# Patient Record
Sex: FEMALE | Race: WHITE | NOT HISPANIC OR LATINO
[De-identification: names, ages, dates, MRNs, and addresses within clinical notes are randomized per-mention and may not be internally consistent; named-entity substitution may affect disease eponyms.]

---

## 2017-01-03 ENCOUNTER — APPOINTMENT (OUTPATIENT)
Dept: HUMAN REPRODUCTION | Facility: CLINIC | Age: 27
End: 2017-01-03

## 2017-01-03 RX ORDER — LETROZOLE TABLETS 2.5 MG/1
2.5 TABLET, FILM COATED ORAL
Qty: 10 | Refills: 0 | Status: ACTIVE | COMMUNITY
Start: 2017-01-03 | End: 1900-01-01

## 2017-01-11 ENCOUNTER — APPOINTMENT (OUTPATIENT)
Dept: HUMAN REPRODUCTION | Facility: CLINIC | Age: 27
End: 2017-01-11

## 2017-01-12 ENCOUNTER — APPOINTMENT (OUTPATIENT)
Dept: HUMAN REPRODUCTION | Facility: CLINIC | Age: 27
End: 2017-01-12

## 2017-01-13 ENCOUNTER — APPOINTMENT (OUTPATIENT)
Dept: HUMAN REPRODUCTION | Facility: CLINIC | Age: 27
End: 2017-01-13

## 2017-01-30 ENCOUNTER — APPOINTMENT (OUTPATIENT)
Dept: HUMAN REPRODUCTION | Facility: CLINIC | Age: 27
End: 2017-01-30

## 2017-01-30 RX ORDER — FOLLITROPIN 900 [IU]/1.08ML
900 INJECTION, SOLUTION SUBCUTANEOUS
Qty: 1 | Refills: 1 | Status: ACTIVE | COMMUNITY
Start: 2017-01-30 | End: 1900-01-01

## 2017-01-30 RX ORDER — GANIRELIX ACETATE 250 UG/.5ML
250 INJECTION, SOLUTION SUBCUTANEOUS
Qty: 4 | Refills: 3 | Status: ACTIVE | COMMUNITY
Start: 2017-01-30 | End: 1900-01-01

## 2017-01-30 RX ORDER — DESOGESTREL AND ETHINYL ESTRADIOL 0.15-0.03
0.15-3 KIT ORAL DAILY
Qty: 1 | Refills: 2 | Status: ACTIVE | COMMUNITY
Start: 2017-01-30 | End: 1900-01-01

## 2017-02-06 ENCOUNTER — APPOINTMENT (OUTPATIENT)
Dept: HUMAN REPRODUCTION | Facility: CLINIC | Age: 27
End: 2017-02-06

## 2017-02-11 ENCOUNTER — APPOINTMENT (OUTPATIENT)
Dept: HUMAN REPRODUCTION | Facility: CLINIC | Age: 27
End: 2017-02-11

## 2017-02-21 ENCOUNTER — APPOINTMENT (OUTPATIENT)
Dept: HUMAN REPRODUCTION | Facility: CLINIC | Age: 27
End: 2017-02-21

## 2017-02-23 ENCOUNTER — OTHER (OUTPATIENT)
Age: 27
End: 2017-02-23

## 2017-02-23 ENCOUNTER — APPOINTMENT (OUTPATIENT)
Dept: HUMAN REPRODUCTION | Facility: CLINIC | Age: 27
End: 2017-02-23

## 2017-02-23 RX ORDER — FOLLITROPIN 300 [IU]/.36ML
300 INJECTION, SOLUTION SUBCUTANEOUS
Qty: 1 | Refills: 3 | Status: ACTIVE | COMMUNITY
Start: 2017-02-23 | End: 1900-01-01

## 2017-02-24 ENCOUNTER — APPOINTMENT (OUTPATIENT)
Dept: HUMAN REPRODUCTION | Facility: CLINIC | Age: 27
End: 2017-02-24

## 2017-02-26 ENCOUNTER — APPOINTMENT (OUTPATIENT)
Dept: HUMAN REPRODUCTION | Facility: CLINIC | Age: 27
End: 2017-02-26

## 2017-02-27 ENCOUNTER — APPOINTMENT (OUTPATIENT)
Dept: HUMAN REPRODUCTION | Facility: CLINIC | Age: 27
End: 2017-02-27

## 2017-02-27 RX ORDER — CHORIOGONADOTROPIN ALFA 10000 UNIT
10000 KIT INTRAMUSCULAR
Qty: 1 | Refills: 0 | Status: ACTIVE | COMMUNITY
Start: 2017-02-27 | End: 1900-01-01

## 2017-03-02 ENCOUNTER — APPOINTMENT (OUTPATIENT)
Dept: HUMAN REPRODUCTION | Facility: CLINIC | Age: 27
End: 2017-03-02

## 2017-03-03 ENCOUNTER — APPOINTMENT (OUTPATIENT)
Dept: HUMAN REPRODUCTION | Facility: CLINIC | Age: 27
End: 2017-03-03

## 2017-03-04 ENCOUNTER — APPOINTMENT (OUTPATIENT)
Dept: HUMAN REPRODUCTION | Facility: CLINIC | Age: 27
End: 2017-03-04

## 2017-03-19 ENCOUNTER — OTHER (OUTPATIENT)
Age: 27
End: 2017-03-19

## 2017-03-19 ENCOUNTER — APPOINTMENT (OUTPATIENT)
Dept: HUMAN REPRODUCTION | Facility: CLINIC | Age: 27
End: 2017-03-19

## 2017-03-19 RX ORDER — DESOGESTREL AND ETHINYL ESTRADIOL AND ETHINYL ESTRADIOL 21-5 (28)
0.15-0.02/0.01 KIT ORAL
Qty: 1 | Refills: 1 | Status: ACTIVE | COMMUNITY
Start: 2017-03-19 | End: 1900-01-01

## 2017-03-19 RX ORDER — DESOGESTREL AND ETHINYL ESTRADIOL AND ETHINYL ESTRADIOL 21-5 (28)
0.15-0.02/0.01 KIT ORAL
Qty: 2 | Refills: 2 | Status: ACTIVE | COMMUNITY
Start: 2017-03-19 | End: 1900-01-01

## 2017-04-24 ENCOUNTER — APPOINTMENT (OUTPATIENT)
Dept: HUMAN REPRODUCTION | Facility: CLINIC | Age: 27
End: 2017-04-24

## 2017-05-12 ENCOUNTER — APPOINTMENT (OUTPATIENT)
Dept: HUMAN REPRODUCTION | Facility: CLINIC | Age: 27
End: 2017-05-12

## 2017-05-15 ENCOUNTER — APPOINTMENT (OUTPATIENT)
Dept: HUMAN REPRODUCTION | Facility: CLINIC | Age: 27
End: 2017-05-15

## 2017-05-18 ENCOUNTER — APPOINTMENT (OUTPATIENT)
Dept: HUMAN REPRODUCTION | Facility: CLINIC | Age: 27
End: 2017-05-18

## 2017-05-18 RX ORDER — FOLLITROPIN 900 [IU]/1.08ML
900 INJECTION, SOLUTION SUBCUTANEOUS
Qty: 2 | Refills: 3 | Status: ACTIVE | COMMUNITY
Start: 2017-05-18 | End: 1900-01-01

## 2017-05-18 RX ORDER — CETRORELIX ACETATE 0.25 MG
0.25 KIT SUBCUTANEOUS
Qty: 4 | Refills: 3 | Status: ACTIVE | COMMUNITY
Start: 2017-05-18 | End: 1900-01-01

## 2017-05-18 RX ORDER — CHORIOGONADOTROPIN ALFA 10000 UNIT
10000 KIT INTRAMUSCULAR
Qty: 1 | Refills: 0 | Status: ACTIVE | COMMUNITY
Start: 2017-05-18 | End: 1900-01-01

## 2017-05-18 RX ORDER — NEEDLES, DISPOSABLE 25GX5/8"
27G X 1/2" NEEDLE, DISPOSABLE MISCELLANEOUS
Qty: 15 | Refills: 2 | Status: ACTIVE | COMMUNITY
Start: 2017-05-18 | End: 1900-01-01

## 2017-05-18 RX ORDER — DOXYCYCLINE HYCLATE 100 MG/1
100 CAPSULE ORAL
Qty: 30 | Refills: 0 | Status: ACTIVE | COMMUNITY
Start: 2017-05-18 | End: 1900-01-01

## 2017-05-18 RX ORDER — MENOTROPINS 75 UNIT
75 KIT SUBCUTANEOUS
Qty: 20 | Refills: 3 | Status: ACTIVE | COMMUNITY
Start: 2017-05-18 | End: 1900-01-01

## 2017-05-23 ENCOUNTER — APPOINTMENT (OUTPATIENT)
Dept: HUMAN REPRODUCTION | Facility: CLINIC | Age: 27
End: 2017-05-23

## 2017-05-25 ENCOUNTER — APPOINTMENT (OUTPATIENT)
Dept: HUMAN REPRODUCTION | Facility: CLINIC | Age: 27
End: 2017-05-25

## 2017-05-26 ENCOUNTER — APPOINTMENT (OUTPATIENT)
Dept: HUMAN REPRODUCTION | Facility: CLINIC | Age: 27
End: 2017-05-26

## 2017-05-26 RX ORDER — MENOTROPINS 75 UNIT
75 KIT SUBCUTANEOUS
Qty: 20 | Refills: 3 | Status: ACTIVE | COMMUNITY
Start: 2017-05-26 | End: 1900-01-01

## 2017-05-26 RX ORDER — FOLLITROPIN 300 [IU]/.36ML
300 INJECTION, SOLUTION SUBCUTANEOUS
Qty: 1 | Refills: 3 | Status: ACTIVE | COMMUNITY
Start: 2017-05-26 | End: 1900-01-01

## 2017-05-27 ENCOUNTER — APPOINTMENT (OUTPATIENT)
Dept: HUMAN REPRODUCTION | Facility: CLINIC | Age: 27
End: 2017-05-27

## 2017-05-28 ENCOUNTER — APPOINTMENT (OUTPATIENT)
Dept: HUMAN REPRODUCTION | Facility: CLINIC | Age: 27
End: 2017-05-28

## 2017-05-29 ENCOUNTER — APPOINTMENT (OUTPATIENT)
Dept: HUMAN REPRODUCTION | Facility: CLINIC | Age: 27
End: 2017-05-29

## 2017-05-30 ENCOUNTER — APPOINTMENT (OUTPATIENT)
Dept: HUMAN REPRODUCTION | Facility: CLINIC | Age: 27
End: 2017-05-30

## 2017-06-02 ENCOUNTER — APPOINTMENT (OUTPATIENT)
Dept: HUMAN REPRODUCTION | Facility: CLINIC | Age: 27
End: 2017-06-02

## 2017-06-04 ENCOUNTER — APPOINTMENT (OUTPATIENT)
Dept: HUMAN REPRODUCTION | Facility: CLINIC | Age: 27
End: 2017-06-04

## 2017-06-13 ENCOUNTER — APPOINTMENT (OUTPATIENT)
Dept: HUMAN REPRODUCTION | Facility: CLINIC | Age: 27
End: 2017-06-13

## 2017-06-13 ENCOUNTER — OTHER (OUTPATIENT)
Age: 27
End: 2017-06-13

## 2017-06-13 RX ORDER — NEEDLES, DISPOSABLE 25GX5/8"
18G X 1-1/2" NEEDLE, DISPOSABLE MISCELLANEOUS
Qty: 40 | Refills: 1 | Status: ACTIVE | COMMUNITY
Start: 2017-06-13 | End: 1900-01-01

## 2017-06-13 RX ORDER — METHYLPREDNISOLONE 8 MG/1
8 TABLET ORAL
Qty: 10 | Refills: 0 | Status: ACTIVE | COMMUNITY
Start: 2017-06-13 | End: 1900-01-01

## 2017-06-13 RX ORDER — ESTRADIOL 2 MG/1
2 TABLET ORAL
Qty: 90 | Refills: 3 | Status: ACTIVE | COMMUNITY
Start: 2017-06-13 | End: 1900-01-01

## 2017-06-13 RX ORDER — SYRINGE WITH NEEDLE, 1 ML 25GX5/8"
22G X 1-1/2" SYRINGE, EMPTY DISPOSABLE MISCELLANEOUS
Qty: 40 | Refills: 1 | Status: ACTIVE | COMMUNITY
Start: 2017-06-13 | End: 1900-01-01

## 2017-06-13 RX ORDER — DOXYCYCLINE HYCLATE 100 MG/1
100 CAPSULE ORAL TWICE DAILY
Qty: 10 | Refills: 0 | Status: ACTIVE | COMMUNITY
Start: 2017-06-13 | End: 1900-01-01

## 2017-06-13 RX ORDER — ISOPROPYL ALCOHOL 70 ML/100ML
SWAB TOPICAL
Qty: 50 | Refills: 0 | Status: ACTIVE | COMMUNITY
Start: 2017-06-13 | End: 1900-01-01

## 2017-06-13 RX ORDER — CONTAINER,EMPTY
EACH MISCELLANEOUS
Qty: 2 | Refills: 0 | Status: ACTIVE | COMMUNITY
Start: 2017-06-13 | End: 1900-01-01

## 2017-06-22 ENCOUNTER — APPOINTMENT (OUTPATIENT)
Dept: HUMAN REPRODUCTION | Facility: CLINIC | Age: 27
End: 2017-06-22

## 2017-06-29 ENCOUNTER — APPOINTMENT (OUTPATIENT)
Dept: HUMAN REPRODUCTION | Facility: CLINIC | Age: 27
End: 2017-06-29

## 2017-06-29 ENCOUNTER — OTHER (OUTPATIENT)
Age: 27
End: 2017-06-29

## 2017-06-29 RX ORDER — FLUCONAZOLE 150 MG/1
150 TABLET ORAL
Qty: 1 | Refills: 2 | Status: ACTIVE | COMMUNITY
Start: 2017-06-29 | End: 1900-01-01

## 2017-06-30 ENCOUNTER — APPOINTMENT (OUTPATIENT)
Dept: HUMAN REPRODUCTION | Facility: CLINIC | Age: 27
End: 2017-06-30

## 2017-06-30 RX ORDER — FLUCONAZOLE 150 MG/1
150 TABLET ORAL
Qty: 1 | Refills: 0 | Status: ACTIVE | COMMUNITY
Start: 2017-06-30 | End: 1900-01-01

## 2017-07-03 ENCOUNTER — APPOINTMENT (OUTPATIENT)
Dept: HUMAN REPRODUCTION | Facility: CLINIC | Age: 27
End: 2017-07-03

## 2017-07-05 ENCOUNTER — APPOINTMENT (OUTPATIENT)
Dept: HUMAN REPRODUCTION | Facility: CLINIC | Age: 27
End: 2017-07-05

## 2017-07-21 ENCOUNTER — APPOINTMENT (OUTPATIENT)
Dept: HUMAN REPRODUCTION | Facility: CLINIC | Age: 27
End: 2017-07-21

## 2017-07-21 DIAGNOSIS — Z33.1 PREGNANT STATE, INCIDENTAL: ICD-10-CM

## 2017-07-21 RX ORDER — ESTRADIOL 2 MG/1
2 TABLET ORAL
Qty: 90 | Refills: 4 | Status: ACTIVE | COMMUNITY
Start: 2017-07-21 | End: 1900-01-01

## 2017-07-21 RX ORDER — DOXYLAMINE SUCCINATE AND PYRIDOXINE HYDROCHLORIDE 10; 10 MG/1; MG/1
10-10 TABLET, DELAYED RELEASE ORAL
Qty: 20 | Refills: 0 | Status: ACTIVE | COMMUNITY
Start: 2017-07-21 | End: 1900-01-01

## 2017-07-21 RX ORDER — PROGESTERONE 50 MG/ML
50 INJECTION, SOLUTION INTRAMUSCULAR
Qty: 2 | Refills: 4 | Status: ACTIVE | COMMUNITY
Start: 2017-07-21 | End: 1900-01-01

## 2017-07-21 RX ORDER — VITAMIN C, CALCIUM, IRON, VITAMIN D3, VITAMIN E, VITAMIN B1, VITAMIN B2, VITAMIN B3, VITAMIN B6, FOLIC ACID, IODINE, ZINC, COPPER, DOCUSATE SODIUM, DOCOSAHEXAENOIC ACID (DHA) 27-1-50 MG
27-1 & 250 KIT ORAL
Qty: 1 | Refills: 5 | Status: ACTIVE | COMMUNITY
Start: 2017-07-21 | End: 1900-01-01

## 2017-07-21 RX ORDER — NEEDLES, DISPOSABLE 25GX5/8"
22G X 1-1/2" NEEDLE, DISPOSABLE MISCELLANEOUS
Qty: 20 | Refills: 3 | Status: ACTIVE | COMMUNITY
Start: 2017-07-21 | End: 1900-01-01

## 2017-07-27 ENCOUNTER — APPOINTMENT (OUTPATIENT)
Dept: HUMAN REPRODUCTION | Facility: CLINIC | Age: 27
End: 2017-07-27
Payer: COMMERCIAL

## 2017-07-27 PROCEDURE — 76817 TRANSVAGINAL US OBSTETRIC: CPT

## 2017-07-27 PROCEDURE — 99213 OFFICE O/P EST LOW 20 MIN: CPT | Mod: 25

## 2017-07-27 RX ORDER — FOLIC ACID 1 MG/1
1 TABLET ORAL DAILY
Qty: 30 | Refills: 2 | Status: ACTIVE | COMMUNITY
Start: 2017-07-27 | End: 1900-01-01

## 2017-08-21 RX ORDER — SYRINGE W-NEEDLE,DISPOSAB,3 ML 25GX5/8"
22G X 1-1/2" SYRINGE, EMPTY DISPOSABLE MISCELLANEOUS
Qty: 15 | Refills: 3 | Status: ACTIVE | COMMUNITY
Start: 2017-05-18 | End: 1900-01-01

## 2017-08-21 RX ORDER — NEEDLES, DISPOSABLE 25GX5/8"
22G X 1-1/2" NEEDLE, DISPOSABLE MISCELLANEOUS
Qty: 20 | Refills: 3 | Status: ACTIVE | COMMUNITY
Start: 2017-08-21 | End: 1900-01-01

## 2017-08-21 RX ORDER — PROGESTERONE 50 MG/ML
50 INJECTION, SOLUTION INTRAMUSCULAR
Qty: 2 | Refills: 6 | Status: ACTIVE | COMMUNITY
Start: 2017-06-13 | End: 1900-01-01

## 2020-08-03 ENCOUNTER — ASOB RESULT (OUTPATIENT)
Age: 30
End: 2020-08-03

## 2020-08-03 ENCOUNTER — APPOINTMENT (OUTPATIENT)
Dept: ANTEPARTUM | Facility: CLINIC | Age: 30
End: 2020-08-03
Payer: COMMERCIAL

## 2020-08-03 PROCEDURE — 76805 OB US >/= 14 WKS SNGL FETUS: CPT

## 2020-08-21 ENCOUNTER — APPOINTMENT (OUTPATIENT)
Dept: ANTEPARTUM | Facility: CLINIC | Age: 30
End: 2020-08-21
Payer: COMMERCIAL

## 2020-08-21 ENCOUNTER — ASOB RESULT (OUTPATIENT)
Age: 30
End: 2020-08-21

## 2020-08-21 PROCEDURE — 76817 TRANSVAGINAL US OBSTETRIC: CPT

## 2020-08-21 PROCEDURE — 76811 OB US DETAILED SNGL FETUS: CPT

## 2020-09-30 ENCOUNTER — TRANSCRIPTION ENCOUNTER (OUTPATIENT)
Age: 30
End: 2020-09-30

## 2020-10-21 ENCOUNTER — APPOINTMENT (OUTPATIENT)
Dept: ANTEPARTUM | Facility: CLINIC | Age: 30
End: 2020-10-21
Payer: COMMERCIAL

## 2020-10-21 ENCOUNTER — ASOB RESULT (OUTPATIENT)
Age: 30
End: 2020-10-21

## 2020-10-21 PROCEDURE — 99072 ADDL SUPL MATRL&STAF TM PHE: CPT

## 2020-10-21 PROCEDURE — 76816 OB US FOLLOW-UP PER FETUS: CPT

## 2020-11-18 ENCOUNTER — ASOB RESULT (OUTPATIENT)
Age: 30
End: 2020-11-18

## 2020-11-18 ENCOUNTER — APPOINTMENT (OUTPATIENT)
Dept: ANTEPARTUM | Facility: CLINIC | Age: 30
End: 2020-11-18
Payer: COMMERCIAL

## 2020-11-18 PROCEDURE — 76816 OB US FOLLOW-UP PER FETUS: CPT

## 2020-12-16 ENCOUNTER — ASOB RESULT (OUTPATIENT)
Age: 30
End: 2020-12-16

## 2020-12-16 ENCOUNTER — APPOINTMENT (OUTPATIENT)
Dept: ANTEPARTUM | Facility: CLINIC | Age: 30
End: 2020-12-16
Payer: COMMERCIAL

## 2020-12-16 PROCEDURE — 99072 ADDL SUPL MATRL&STAF TM PHE: CPT

## 2020-12-16 PROCEDURE — 76816 OB US FOLLOW-UP PER FETUS: CPT

## 2021-01-09 ENCOUNTER — INPATIENT (INPATIENT)
Facility: HOSPITAL | Age: 31
LOS: 1 days | Discharge: HOME | End: 2021-01-11
Attending: OBSTETRICS & GYNECOLOGY | Admitting: OBSTETRICS & GYNECOLOGY
Payer: COMMERCIAL

## 2021-01-09 ENCOUNTER — TRANSCRIPTION ENCOUNTER (OUTPATIENT)
Age: 31
End: 2021-01-09

## 2021-01-09 VITALS — HEART RATE: 91 BPM | SYSTOLIC BLOOD PRESSURE: 112 MMHG | DIASTOLIC BLOOD PRESSURE: 69 MMHG | OXYGEN SATURATION: 99 %

## 2021-01-09 DIAGNOSIS — Z3A.00 WEEKS OF GESTATION OF PREGNANCY NOT SPECIFIED: ICD-10-CM

## 2021-01-09 DIAGNOSIS — O26.899 OTHER SPECIFIED PREGNANCY RELATED CONDITIONS, UNSPECIFIED TRIMESTER: ICD-10-CM

## 2021-01-09 DIAGNOSIS — Z34.80 ENCOUNTER FOR SUPERVISION OF OTHER NORMAL PREGNANCY, UNSPECIFIED TRIMESTER: ICD-10-CM

## 2021-01-09 DIAGNOSIS — Z98.891 HISTORY OF UTERINE SCAR FROM PREVIOUS SURGERY: Chronic | ICD-10-CM

## 2021-01-09 LAB
BASOPHILS # BLD AUTO: 0.02 K/UL — SIGNIFICANT CHANGE UP (ref 0–0.2)
BASOPHILS NFR BLD AUTO: 0.2 % — SIGNIFICANT CHANGE UP (ref 0–2)
EOSINOPHIL # BLD AUTO: 0.02 K/UL — SIGNIFICANT CHANGE UP (ref 0–0.5)
EOSINOPHIL NFR BLD AUTO: 0.2 % — SIGNIFICANT CHANGE UP (ref 0–6)
HCT VFR BLD CALC: 41.4 % — SIGNIFICANT CHANGE UP (ref 34.5–45)
HGB BLD-MCNC: 13.5 G/DL — SIGNIFICANT CHANGE UP (ref 11.5–15.5)
IMM GRANULOCYTES NFR BLD AUTO: 0.6 % — SIGNIFICANT CHANGE UP (ref 0–1.5)
LYMPHOCYTES # BLD AUTO: 0.93 K/UL — LOW (ref 1–3.3)
LYMPHOCYTES # BLD AUTO: 11.5 % — LOW (ref 13–44)
MCHC RBC-ENTMCNC: 29.7 PG — SIGNIFICANT CHANGE UP (ref 27–34)
MCHC RBC-ENTMCNC: 32.6 GM/DL — SIGNIFICANT CHANGE UP (ref 32–36)
MCV RBC AUTO: 91.2 FL — SIGNIFICANT CHANGE UP (ref 80–100)
MONOCYTES # BLD AUTO: 0.48 K/UL — SIGNIFICANT CHANGE UP (ref 0–0.9)
MONOCYTES NFR BLD AUTO: 5.9 % — SIGNIFICANT CHANGE UP (ref 2–14)
NEUTROPHILS # BLD AUTO: 6.62 K/UL — SIGNIFICANT CHANGE UP (ref 1.8–7.4)
NEUTROPHILS NFR BLD AUTO: 81.6 % — HIGH (ref 43–77)
NRBC # BLD: 0 /100 WBCS — SIGNIFICANT CHANGE UP (ref 0–0)
PLATELET # BLD AUTO: 147 K/UL — LOW (ref 150–400)
RBC # BLD: 4.54 M/UL — SIGNIFICANT CHANGE UP (ref 3.8–5.2)
RBC # FLD: 14.4 % — SIGNIFICANT CHANGE UP (ref 10.3–14.5)
SARS-COV-2 RNA SPEC QL NAA+PROBE: SIGNIFICANT CHANGE UP
WBC # BLD: 8.12 K/UL — SIGNIFICANT CHANGE UP (ref 3.8–10.5)
WBC # FLD AUTO: 8.12 K/UL — SIGNIFICANT CHANGE UP (ref 3.8–10.5)

## 2021-01-09 RX ORDER — ACETAMINOPHEN 500 MG
975 TABLET ORAL
Refills: 0 | Status: DISCONTINUED | OUTPATIENT
Start: 2021-01-09 | End: 2021-01-11

## 2021-01-09 RX ORDER — SODIUM CHLORIDE 9 MG/ML
3 INJECTION INTRAMUSCULAR; INTRAVENOUS; SUBCUTANEOUS EVERY 8 HOURS
Refills: 0 | Status: DISCONTINUED | OUTPATIENT
Start: 2021-01-09 | End: 2021-01-11

## 2021-01-09 RX ORDER — OXYTOCIN 10 UNIT/ML
333.33 VIAL (ML) INJECTION
Qty: 20 | Refills: 0 | Status: DISCONTINUED | OUTPATIENT
Start: 2021-01-09 | End: 2021-01-11

## 2021-01-09 RX ORDER — SODIUM CHLORIDE 9 MG/ML
1000 INJECTION, SOLUTION INTRAVENOUS
Refills: 0 | Status: DISCONTINUED | OUTPATIENT
Start: 2021-01-09 | End: 2021-01-10

## 2021-01-09 RX ORDER — OXYCODONE HYDROCHLORIDE 5 MG/1
5 TABLET ORAL ONCE
Refills: 0 | Status: DISCONTINUED | OUTPATIENT
Start: 2021-01-09 | End: 2021-01-11

## 2021-01-09 RX ORDER — OXYCODONE HYDROCHLORIDE 5 MG/1
5 TABLET ORAL
Refills: 0 | Status: DISCONTINUED | OUTPATIENT
Start: 2021-01-09 | End: 2021-01-11

## 2021-01-09 RX ORDER — ACETAMINOPHEN 500 MG
3 TABLET ORAL
Qty: 0 | Refills: 0 | DISCHARGE
Start: 2021-01-09

## 2021-01-09 RX ORDER — DIPHENHYDRAMINE HCL 50 MG
25 CAPSULE ORAL EVERY 6 HOURS
Refills: 0 | Status: DISCONTINUED | OUTPATIENT
Start: 2021-01-09 | End: 2021-01-11

## 2021-01-09 RX ORDER — CITRIC ACID/SODIUM CITRATE 300-500 MG
15 SOLUTION, ORAL ORAL EVERY 6 HOURS
Refills: 0 | Status: DISCONTINUED | OUTPATIENT
Start: 2021-01-09 | End: 2021-01-10

## 2021-01-09 RX ORDER — KETOROLAC TROMETHAMINE 30 MG/ML
30 SYRINGE (ML) INJECTION ONCE
Refills: 0 | Status: DISCONTINUED | OUTPATIENT
Start: 2021-01-09 | End: 2021-01-09

## 2021-01-09 RX ORDER — DIBUCAINE 1 %
1 OINTMENT (GRAM) RECTAL EVERY 6 HOURS
Refills: 0 | Status: DISCONTINUED | OUTPATIENT
Start: 2021-01-09 | End: 2021-01-11

## 2021-01-09 RX ORDER — HYDROCORTISONE 1 %
1 OINTMENT (GRAM) TOPICAL EVERY 6 HOURS
Refills: 0 | Status: DISCONTINUED | OUTPATIENT
Start: 2021-01-09 | End: 2021-01-11

## 2021-01-09 RX ORDER — TETANUS TOXOID, REDUCED DIPHTHERIA TOXOID AND ACELLULAR PERTUSSIS VACCINE, ADSORBED 5; 2.5; 8; 8; 2.5 [IU]/.5ML; [IU]/.5ML; UG/.5ML; UG/.5ML; UG/.5ML
0.5 SUSPENSION INTRAMUSCULAR ONCE
Refills: 0 | Status: DISCONTINUED | OUTPATIENT
Start: 2021-01-09 | End: 2021-01-11

## 2021-01-09 RX ORDER — MAGNESIUM HYDROXIDE 400 MG/1
30 TABLET, CHEWABLE ORAL
Refills: 0 | Status: DISCONTINUED | OUTPATIENT
Start: 2021-01-09 | End: 2021-01-11

## 2021-01-09 RX ORDER — AER TRAVELER 0.5 G/1
1 SOLUTION RECTAL; TOPICAL EVERY 4 HOURS
Refills: 0 | Status: DISCONTINUED | OUTPATIENT
Start: 2021-01-09 | End: 2021-01-11

## 2021-01-09 RX ORDER — PRAMOXINE HYDROCHLORIDE 150 MG/15G
1 AEROSOL, FOAM RECTAL EVERY 4 HOURS
Refills: 0 | Status: DISCONTINUED | OUTPATIENT
Start: 2021-01-09 | End: 2021-01-11

## 2021-01-09 RX ORDER — SERTRALINE 25 MG/1
1 TABLET, FILM COATED ORAL
Qty: 0 | Refills: 0 | DISCHARGE
Start: 2021-01-09

## 2021-01-09 RX ORDER — LANOLIN
1 OINTMENT (GRAM) TOPICAL
Qty: 0 | Refills: 0 | DISCHARGE
Start: 2021-01-09

## 2021-01-09 RX ORDER — OXYTOCIN 10 UNIT/ML
2 VIAL (ML) INJECTION
Qty: 30 | Refills: 0 | Status: DISCONTINUED | OUTPATIENT
Start: 2021-01-09 | End: 2021-01-11

## 2021-01-09 RX ORDER — IBUPROFEN 200 MG
600 TABLET ORAL EVERY 6 HOURS
Refills: 0 | Status: COMPLETED | OUTPATIENT
Start: 2021-01-09 | End: 2021-12-08

## 2021-01-09 RX ORDER — SIMETHICONE 80 MG/1
80 TABLET, CHEWABLE ORAL EVERY 4 HOURS
Refills: 0 | Status: DISCONTINUED | OUTPATIENT
Start: 2021-01-09 | End: 2021-01-11

## 2021-01-09 RX ORDER — IBUPROFEN 200 MG
1 TABLET ORAL
Qty: 0 | Refills: 0 | DISCHARGE
Start: 2021-01-09

## 2021-01-09 RX ORDER — LANOLIN
1 OINTMENT (GRAM) TOPICAL EVERY 6 HOURS
Refills: 0 | Status: DISCONTINUED | OUTPATIENT
Start: 2021-01-09 | End: 2021-01-11

## 2021-01-09 RX ORDER — BENZOCAINE 10 %
1 GEL (GRAM) MUCOUS MEMBRANE EVERY 6 HOURS
Refills: 0 | Status: DISCONTINUED | OUTPATIENT
Start: 2021-01-09 | End: 2021-01-11

## 2021-01-09 RX ORDER — SERTRALINE 25 MG/1
100 TABLET, FILM COATED ORAL DAILY
Refills: 0 | Status: DISCONTINUED | OUTPATIENT
Start: 2021-01-09 | End: 2021-01-11

## 2021-01-09 RX ADMIN — Medication 30 MILLIGRAM(S): at 23:30

## 2021-01-09 RX ADMIN — SODIUM CHLORIDE 125 MILLILITER(S): 9 INJECTION, SOLUTION INTRAVENOUS at 16:39

## 2021-01-09 RX ADMIN — Medication 2 MILLIUNIT(S)/MIN: at 16:39

## 2021-01-09 NOTE — OB PROVIDER LABOR PROGRESS NOTE - ASSESSMENT
31 y/o  at 40w1d TOLAC. s/p CB, pitocin currently at 2.  - increase pitocin as able  - AROM when cervix is more favorable    d/w Dr. Loulou Smiley PGY1

## 2021-01-09 NOTE — OB PROVIDER H&P - HISTORY OF PRESENT ILLNESS
Admission H&P    Subjective  HPI: 31yo  @40.1 presents w/regular painful contractions, . +FM. -LOF. -CTXs. -VB. Pt denies any other concerns.    – PNC: Denies prenatal issues. GBS _.  EFW _g by sono.  – OBHx:   – GynHx: denies h/o fibroids, cysts, abnormal paps, STDs including HSV  – PMH: denies  – PSH: denies  – Psych: denies h/o anxiety or depression  – Social: denies t/a/d in pregnancy  – Meds: PNV   – Allergies: NKDA  – Will accept blood transfusions? Yes    Objective  T(C): 36.8 (21 @ 10:13), Max: 36.8 (21 @ 10:13)  HR: 89 (21 @ 12:41) (77 - 98)  BP: 112/69 (21 @ 10:13) (112/69 - 112/69)  RR: 20 (21 @ 10:13) (20 - 20)  SpO2: 98% (21 @ 12:41) (85% - 99%)      – PE:   CV: RRR  Pulm: breathing comfortably on RA  Abd: gravid, nontender  Extr: moving all extremities with ease  – FS:   – Spec: pooling, nitrazine, ferning, bleeding,  (lesions if patient with HSV2 history)  – VE: //  – FHT: baseline 1, mod variability, +accels, -decels  – [place]: qmin  – EFW: _g by sono  – Sono: vertex    Assessment  – No prenatal issues. GBS _.    Plan  1. Admit to LND. Routine Labs. IVF.  2. Expectant management/IOL w/  3. Fetus: cat 1 tracing. VTX. EFW _g by sono. Continuous EFM. Sono. No concerns.  4. Prenatal issues: none  5. GBS _  6. Pain: IV pain meds/epidural per attending     Patient discussed with attending physician,  .          Operative report was checked and there is no extension and uterus was closed in two layer.  now she is in early labor. Admission H&P    Subjective  HPI: 29yo  @40.1 presents w/regular painful contractions and VB w/an uncomplicated pregnancy. +FM. -LOF. She would like to TOLAC after previous C/S for TIUP in 2018. Per operative report, no extension and uterus was closed in two layers. Pt denies any other concerns. GBS neg, EFW 3600.    – PNC: Denies prenatal issues. GBS negative.  EFW 3600g by sono.  – OBHx: 2018 - C/S for TIUP @36 weeks, IVF pregnancy, 5lb and 5lb 6oz  – GynHx: denies h/o fibroids, cysts, abnormal paps, STDs including HSV  – PMH: denies  – PSH: C/S x1  – Psych: h/o PPD after previous pregnancy, on antidepressants and seeing psychiatrist regularly  – Social: denies t/a/d in pregnancy  – Meds: PNV, sertaline 100mg QD  – Allergies: NKDA  – Will accept blood transfusions? Yes    Objective  T(C): 36.8 (21 @ 10:13), Max: 36.8 (21 @ 10:13)  HR: 89 (21 @ 12:41) (77 - 98)  BP: 112/69 (21 @ 10:13) (112/69 - 112/69)  RR: 20 (21 @ 10:13) (20 - 20)  SpO2: 98% (21 @ 12:41) (85% - 99%)    – PE:   CV: RRR  Pulm: breathing comfortably on RA  Abd: gravid, nontender  Extr: moving all extremities with ease  – VE: 2/50/-3  – FHT: baseline 150, mod variability, +accels, -decels  – [place]: q5min  – EFW: 3600g by sono  – Sono: vertex    Assessment  TOLAC, h/o PPD on sertraline, GBS negative.    Plan  1. Admit to LND. Routine Labs. IVF.  2. Cervical balloon after epidural   3. Fetus: cat 1 tracing. VTX. EFW _g by sono. Continuous EFM. Sono. No concerns.  4. Prenatal issues: h/o PPD in previous pregnancy, currently on sertraline. SW consult.   5. GBS negative  6. Pain: IV pain meds/epidural per attending     Patient discussed with attending physician, Dr. Jamil.

## 2021-01-09 NOTE — OB PROVIDER H&P - NSFIRSTDATEVISIT_OBGYN_ALL_OB
[facility]    Discharge Summary    Bradford Chino Patient Status:  Inpatient    1941 MRN [id number]   Location [facility] 3W/SW Attending Bria Ford MD   [address] Day # 2 PCP Michele Shane Unknown

## 2021-01-09 NOTE — DISCHARGE NOTE OB - PATIENT PORTAL LINK FT
You can access the FollowMyHealth Patient Portal offered by [facility] by registering at the following website: [email or web address]. By joining [employer]’s FollowMyHealth portal, you will also be able to view your health information using other applications (apps) compatible with our system.

## 2021-01-09 NOTE — DISCHARGE NOTE OB - MEDICATION SUMMARY - MEDICATIONS TO TAKE
I will START or STAY ON the medications listed below when I get home from the hospital:    acetaminophen 325 mg oral tablet  -- 3 tab(s) by mouth   -- Indication: For for moderate pain    ibuprofen 600 mg oral tablet  -- 1 tab(s) by mouth every 6 hours  -- Indication: For for sever pain    sertraline 100 mg oral tablet  -- 1 tab(s) by mouth once a day  -- Indication: For for anxiety    lanolin topical ointment  -- 1 application on skin every 6 hours, As needed, nipple soreness  -- Indication: For for breast feeding    Prenatal Multivitamins with Folic Acid 1 mg oral tablet  -- 1 tab(s) by mouth once a day  -- Indication: For for breast feeding

## 2021-01-09 NOTE — OB NEONATOLOGY/PEDIATRICIAN DELIVERY SUMMARY - NSPEDSNEONOTESA_OBGYN_ALL_OB_FT
Baby is a 40.1wk GA M born to 29y/o  mother via . Maternal history anxiety, depression, and post-partum depression. Prenatal history uncomplicated. Maternal BT O+. PNL neg, NR, and immune. GBS neg unknown. AROM at 21:22 on , thick mec fluids. Baby born vigorous and crying spontaneously. WDSS. Apgars 8/9. EOS .10. Mom plans to breastfeed. COVID status negative.    Physical Exam (Post-Delivery)  Gen: NAD; well-appearing  HEENT: NC/AT; anterior fontanelle open and flat; ears and nose clinically patent, normally set; no tags, no cleft palate appreciated  Skin: pink, warm, well-perfused, no rash  Resp: non-labored breathing  Abd: soft, NT/ND; no masses appreciated, umbilical cord with 3 vessels  Extremities: moving all extremities, no crepitus; hips negative O/B  MSK: no clavicular fracture appreciated  : Lamin I; no abnormalities; anus patent  Back: + sacral dimple  Neuro: +emmett, +babinski, grasp, good tone throughout

## 2021-01-09 NOTE — OB PROVIDER LABOR PROGRESS NOTE - ASSESSMENT
-CB successfully placed, 80 cc sterile water placed in uterine and vaginal balloon  -epidural top off for pain control  -category 1 tracing    d/w Dr. Loulou Askew PGY2

## 2021-01-09 NOTE — OB RN DELIVERY SUMMARY - NS_SEPSISRSKCALC_OBGYN_ALL_OB_FT
EOS calculated successfully. EOS Risk Factor: 0.5/1000 live births ([facility] national incidence); GA=40w1d; Temp=98.96; ROM=0.8; GBS='Unknown'; Antibiotics='No antibiotics or any antibiotics < 2 hrs prior to birth'

## 2021-01-09 NOTE — OB PROVIDER LABOR PROGRESS NOTE - ASSESSMENT
29yo  at 40+1 here for TOLAC. Pt examined in setting of reporting LOF and painful contractions. SVE 6.5/90/0, membranes palpated. Noted to have significant blood show, tracing cat 1. Continue current management, anticipate .    Cynthia WEST

## 2021-01-09 NOTE — DISCHARGE NOTE OB - HOSPITAL COURSE
she is admitted went to labor , labor augmented with low dose pitocin had vaginal delivery of baby girl. admitted in labor , augmented with cervical foly and low dose pitocing, vaginal delivery with second degree tear was noticed.

## 2021-01-09 NOTE — OB PROVIDER DELIVERY SUMMARY - NSPROVIDERDELIVERYNOTE_OBGYN_ALL_OB_FT
Spontaneous vaginal delivery of liveborn infant from OA position. Nuchal cord x2 that was reduced prior to delivery of shoulders and body. Infant was suctioned, heavy mec present. Cord was clamped and cut and infant was passed to pediatrician. Placenta was delivered intact with a 3 vessel cord. Fundal massage was given and uterus was found to be firm. Vaginal exam revealed an intact cervix, vaginal walls and sulci. Patient had a second degree laceration in the perineum and right labial tear which were repaired with 2.0 chromic suture x2 and 3.0 chromic suture x2. Excellent hemostasis was noted. Count was correct x2.    Aliza Smiley PGY1 patient came in to labor room in early labor, uncomfortable, she was admitted and labor augmented by placing cervical balloon, and was started on low dose Pitocin , max was 6 miu contractions 2-3 min apart, AROM was done at 10 cm where thick mec was noticed fh has been unremarkable all throw out.  Spontaneous vaginal delivery of liveborn infant from OA position. Nuchal cord x2 that was reduced prior to delivery of shoulders and body. Infant was suctioned, heavy mec present. Cord was clamped and cut and infant was passed to pediatrician. Placenta was delivered intact with a 3 vessel cord. Fundal massage was given and uterus was found to be firm. Vaginal exam revealed an intact cervix, vaginal walls and sulci. Patient had a second degree laceration in the perineum and right labial tear which were repaired with 2.0 chromic suture x2 and 3.0 chromic suture x2. Excellent hemostasis was noted. Count was correct x2.    Aliza Smiley PGY1

## 2021-01-09 NOTE — OB PROVIDER H&P - ASSESSMENT
Assessment  TOLAC, h/o PPD on sertraline, GBS negative.    Plan  1. Admit to LND. Routine Labs. IVF.  2. Cervical balloon after epidural. Cytotec contraindicated.   3. Fetus: cat 1 tracing. VTX. EFW 3600g by sono. Continuous EFM. Sono. No concerns.  4. Prenatal issues: h/o PPD in previous pregnancy, currently on sertraline. SW consult.   5. GBS negative  6. Pain: IV pain meds/epidural per attending     Patient discussed with attending physician, Dr. Jamil.

## 2021-01-09 NOTE — DISCHARGE NOTE OB - CARE PLAN
Principal Discharge DX:	Term pregnancy delivered  Goal:	safe delivery of mother and baby  Assessment and plan of treatment:	post partum care  Secondary Diagnosis:	Anxiety  Goal:	mental stability  Assessment and plan of treatment:	continue with her medication , follow up with her phycologist and family support

## 2021-01-09 NOTE — OB PROVIDER TRIAGE NOTE - HISTORY OF PRESENT ILLNESS
term pregnancy with history of previous cesarian section, for twin pregnancy. not is trying for TOLAC.  Operative report was checked and there is no extension and uterus was closed in two layer.  now she is in early labor.

## 2021-01-09 NOTE — DISCHARGE NOTE OB - ADDITIONAL INSTRUCTIONS
follow up in 4 week call if heavy vaginal bleeding, difficulty bleeding, if she has depression, or over whelmed contact our office or her phycologist,

## 2021-01-09 NOTE — OB RN DELIVERY SUMMARY - BABY A: APGAR 1 MIN MUSCLE TONE, DELIVERY
Antwon Galarza is shorter on breath than usual and has some back pain, scheduled tomorrow at 1:15 should she be seen sooner? pneumonia?
Called Tracy and advised   Script sent to [facility]
Calling Jeovany Vicente- she is with her mom right now     Roshan Yip was out yesterday and she was c/o back pain.  Thought it was muscular    Good appetite, slept ok last night   She isn't really \"struggling to breathe\"   She is feeling more SOB with exertion, still ha
Have Asher start on Levaquin 500 mg once a day, #10. Take the first 1 today so that if there is any start of pneumonia will get a jump on it with the antibiotic.
(2) well flexed

## 2021-01-09 NOTE — DISCHARGE NOTE OB - CARE PROVIDER_API CALL
Cinthya Jamil J  OBSTETRICS AND GYNECOLOGY  [address], [address]  [place] [address]  Phone: [phone]  Fax: [phone]  Established Patient  Follow Up Time: 1 month

## 2021-01-09 NOTE — OB PROVIDER LABOR PROGRESS NOTE - NS_OBIHIFHRDETAILS_OBGYN_ALL_OB_FT
baseline 135, mod elvis, +accels, no decel
130s, mod elvis, +accels, -decels
baseline 135. mod variability, + accels, no decels

## 2021-01-09 NOTE — OB PROVIDER LABOR PROGRESS NOTE - NS_SUBJECTIVE/OBJECTIVE_OBGYN_ALL_OB_FT
Pt seen for placement of cervical balloon
Evaluated patient for cervical change.
Pt feels leaking and more painful contractions

## 2021-01-10 DIAGNOSIS — O34.219 MATERNAL CARE FOR UNSPECIFIED TYPE SCAR FROM PREVIOUS CESAREAN DELIVERY: ICD-10-CM

## 2021-01-10 DIAGNOSIS — F32.9 MAJOR DEPRESSIVE DISORDER, SINGLE EPISODE, UNSPECIFIED: ICD-10-CM

## 2021-01-10 RX ORDER — IBUPROFEN 200 MG
600 TABLET ORAL EVERY 6 HOURS
Refills: 0 | Status: DISCONTINUED | OUTPATIENT
Start: 2021-01-10 | End: 2021-01-11

## 2021-01-10 RX ADMIN — Medication 975 MILLIGRAM(S): at 21:12

## 2021-01-10 RX ADMIN — Medication 600 MILLIGRAM(S): at 12:33

## 2021-01-10 RX ADMIN — Medication 975 MILLIGRAM(S): at 10:27

## 2021-01-10 RX ADMIN — Medication 975 MILLIGRAM(S): at 03:33

## 2021-01-10 RX ADMIN — SODIUM CHLORIDE 3 MILLILITER(S): 9 INJECTION INTRAMUSCULAR; INTRAVENOUS; SUBCUTANEOUS at 06:00

## 2021-01-10 RX ADMIN — Medication 1 TABLET(S): at 12:35

## 2021-01-10 RX ADMIN — Medication 600 MILLIGRAM(S): at 06:36

## 2021-01-10 RX ADMIN — Medication 975 MILLIGRAM(S): at 16:07

## 2021-01-10 RX ADMIN — Medication 600 MILLIGRAM(S): at 18:37

## 2021-01-10 NOTE — CHART NOTE - NSCHARTNOTEFT_GEN_A_CORE
R1 Chart Note     Nurse notified that patient will not be going home today as the baby has not stooled or urinated as of yet. Peds would like baby to stay until the morning for more evaluation     d/w Dr. Ella Dyer, PGY-1 R1 Chart Note     Nurse notified that patient will not be going home today as the baby has not stooled or urinated as of yet. Peds would like baby to stay until the morning for more evaluation      -Discharge order cancelled and changed for 1/11     d/w Dr. Ella Dyer, PGY-1

## 2021-01-10 NOTE — PROGRESS NOTE ADULT - ASSESSMENT
A: 30y PPD#1 s/p  doing well. with possible strech of left inguinal ligament from pushing.    Plan:  Routine postpartum care  Encouraged out of bed  Regular diet  Tylenol for pain  will discharge home if baby is clear after 24 hours other wise tomorrow AM.

## 2021-01-11 VITALS
DIASTOLIC BLOOD PRESSURE: 72 MMHG | OXYGEN SATURATION: 98 % | RESPIRATION RATE: 18 BRPM | TEMPERATURE: 99 F | HEART RATE: 70 BPM | SYSTOLIC BLOOD PRESSURE: 104 MMHG

## 2021-01-11 LAB
SARS-COV-2 IGG SERPL QL IA: NEGATIVE — SIGNIFICANT CHANGE UP
SARS-COV-2 IGM SERPL IA-ACNC: <0.1 INDEX — SIGNIFICANT CHANGE UP

## 2021-01-11 PROCEDURE — G0463: CPT

## 2021-01-11 PROCEDURE — U0005: CPT

## 2021-01-11 PROCEDURE — 59025 FETAL NON-STRESS TEST: CPT

## 2021-01-11 PROCEDURE — 59050 FETAL MONITOR W/REPORT: CPT

## 2021-01-11 PROCEDURE — 86780 TREPONEMA PALLIDUM: CPT

## 2021-01-11 PROCEDURE — 87635 SARS-COV-2 COVID-19 AMP PRB: CPT

## 2021-01-11 PROCEDURE — 86769 SARS-COV-2 COVID-19 ANTIBODY: CPT

## 2021-01-11 PROCEDURE — 85025 COMPLETE CBC W/AUTO DIFF WBC: CPT

## 2021-01-11 RX ADMIN — Medication 600 MILLIGRAM(S): at 11:06

## 2021-01-11 RX ADMIN — SERTRALINE 100 MILLIGRAM(S): 25 TABLET, FILM COATED ORAL at 12:50

## 2021-01-11 RX ADMIN — Medication 1 TABLET(S): at 12:48

## 2021-01-11 NOTE — PROGRESS NOTE ADULT - ASSESSMENT
A: 30y PPD#2 s/p  doing well.    Plan:  Routine postpartum care  Encouraged out of bed  Regular diet

## 2021-01-11 NOTE — PROGRESS NOTE ADULT - SUBJECTIVE AND OBJECTIVE BOX
S: Patient doing well. No complaints. Minimal lochia. Pain controlled.    O: Vital Signs Last 24 Hrs  T(C): 37 (11 Jan 2021 05:30), Max: 37 (11 Jan 2021 05:30)  T(F): 98.6 (11 Jan 2021 05:30), Max: 98.6 (11 Jan 2021 05:30)  HR: 70 (11 Jan 2021 05:30) (68 - 100)  BP: 104/72 (11 Jan 2021 05:30) (104/72 - 106/74)  BP(mean): --  RR: 18 (11 Jan 2021 05:30) (17 - 18)  SpO2: 98% (11 Jan 2021 05:30) (96% - 98%)    Gen: NAD  Abd: soft, Nontender, Nondistended, fundus firm  Ext: no tendern, mild edema    Labs:                        13.5   8.12  )-----------( 147      ( 09 Jan 2021 13:33 )             41.4       
S: Patient doing well. No complaints. Minimal lochia. Pain controlled.  she is breast feeding.  she is having pain in left inguinal area, she is able to walk and get in and out of bed. no weakness in the leg    O: Vital Signs Last 24 Hrs  T(C): 37.1 (10 Dalton 2021 08:26), Max: 37.2 (09 Jan 2021 20:00)  T(F): 98.7 (10 Dalton 2021 08:26), Max: 99 (10 Dalton 2021 05:50)  HR: 70 (10 Dalton 2021 08:26) (70 - 141)  BP: 105/70 (10 Dalton 2021 08:26) (89/53 - 149/67)  BP(mean): 77 (10 Dalton 2021 00:35) (74 - 81)  RR: 18 (10 Dalton 2021 08:26) (17 - 22)  SpO2: 96% (10 Dalton 2021 08:26) (61% - 100%)    Gen: NAD  Abd: soft, Nontender, Nondistended, fundus firm  Ext: no tendern, mild edema good tone both legs    Labs:                        13.5   8.12  )-----------( 147      ( 09 Jan 2021 13:33 )             41.4       
PA PPD#1  Note    Blood Type:   O+  Pain:  Controlled  Complaints:  None  Pt. is OOB, voiding, passing flatus, & tolerating regular diet.  Lochia:  WNL    VS:  Vital Signs Last 24 Hrs  T(C): 37.2 (10 Dalton 2021 05:50), Max: 37.2 (2021 20:00)  T(F): 99 (10 Dalton 2021 05:50), Max: 99 (10 Dalton 2021 05:50)  HR: 83 (10 Dalton 2021 05:50) (71 - 141)  BP: 102/66 (10 Dalton 2021 05:50) (89/53 - 149/67)  BP(mean): 77 (10 Dalton 2021 00:35) (74 - 81)  RR: 18 (10 Dalton 2021 05:50) (17 - 22)  SpO2: 95% (10 Dalton 2021 05:50) (61% - 100%)                        13.5   8.12  )-----------( 147      ( 2021 13:33 )             41.4       Abd:  Soft, non-distended, non-tender            Fundus-firm  Laceration-2nd Degreey: C/D/I  Extremities:  Edema - none                      Calf Tenderness - none    Assessment:    30 y.o. G 2 P 1103 (h/o twins)      PPD # 1 S/P  in stable condition.  PMHx significant for:  PP Depression  Current Issues:  None  Plan:  Increase OOB             Cont. Pain Protocol             Cont. Reg. Diet             Cont. PP Care.             Cont. Sertaline    GIAN Chaney

## 2021-01-12 LAB — T PALLIDUM AB TITR SER: NEGATIVE — SIGNIFICANT CHANGE UP

## 2021-01-25 ENCOUNTER — APPOINTMENT (OUTPATIENT)
Dept: ULTRASOUND IMAGING | Facility: CLINIC | Age: 31
End: 2021-01-25
Payer: COMMERCIAL

## 2021-01-25 ENCOUNTER — OUTPATIENT (OUTPATIENT)
Dept: OUTPATIENT SERVICES | Facility: HOSPITAL | Age: 31
LOS: 1 days | End: 2021-01-25
Payer: COMMERCIAL

## 2021-01-25 DIAGNOSIS — Z98.891 HISTORY OF UTERINE SCAR FROM PREVIOUS SURGERY: Chronic | ICD-10-CM

## 2021-01-25 DIAGNOSIS — Z00.8 ENCOUNTER FOR OTHER GENERAL EXAMINATION: ICD-10-CM

## 2021-01-25 PROBLEM — K21.9 GASTRO-ESOPHAGEAL REFLUX DISEASE WITHOUT ESOPHAGITIS: Chronic | Status: ACTIVE | Noted: 2021-01-09

## 2021-01-25 PROBLEM — F32.9 MAJOR DEPRESSIVE DISORDER, SINGLE EPISODE, UNSPECIFIED: Chronic | Status: ACTIVE | Noted: 2021-01-09

## 2021-01-25 PROBLEM — F41.9 ANXIETY DISORDER, UNSPECIFIED: Chronic | Status: ACTIVE | Noted: 2021-01-09

## 2021-01-25 PROCEDURE — 76700 US EXAM ABDOM COMPLETE: CPT

## 2021-01-25 PROCEDURE — 76700 US EXAM ABDOM COMPLETE: CPT | Mod: 26

## 2022-08-10 NOTE — OB PROVIDER H&P - NSLASTLIVEBIRTH_OBGYN_ALL_OB_DT
09-Jan-2018 Prednisone Counseling:  I discussed with the patient the risks of prolonged use of prednisone including but not limited to weight gain, insomnia, osteoporosis, mood changes, diabetes, susceptibility to infection, glaucoma and high blood pressure.  In cases where prednisone use is prolonged, patients should be monitored with blood pressure checks, serum glucose levels and an eye exam.  Additionally, the patient may need to be placed on GI prophylaxis, PCP prophylaxis, and calcium and vitamin D supplementation and/or a bisphosphonate.  The patient verbalized understanding of the proper use and the possible adverse effects of prednisone.  All of the patient's questions and concerns were addressed.

## 2022-10-17 NOTE — OB PROVIDER TRIAGE NOTE - NS_FETALMOVEMENT_OBGYN_ALL_OB
Pre-Op H&P  Kt Davis  [id number]  1953      Chief complaint: Left hip pain      Subjective:  Patient is a 69 y.o.male presents for scheduled surgery by Dr. Swenson. He anticipates a TOTAL HIP ARTHROPLASTY LEFT today. His hip has been painful for about 2 years. He uses a cane for ambulation. He denies recent falls. He had right hip replacement 9/12/22; which he recovered well.      Review of Systems:  Constitutional-- No fever, chills or sweats. No fatigue.  CV-- No chest pain, palpitation or syncope. +HTN  Resp-- No SOB, cough, hemoptysis  Skin--No rashes or lesions      Allergies:   Allergies   Allergen Reactions   • Lisinopril Cough   • Valsartan Cough         Home Meds:  Medications Prior to Admission   Medication Sig Dispense Refill Last Dose   • acetaminophen (TYLENOL) 650 MG 8 hr tablet Take 1 tablet by mouth Every 8 (Eight) Hours As Needed for Mild Pain.   10/16/2022 at 23450   • amLODIPine (NORVASC) 5 MG tablet Take 1 tablet by mouth Daily. (Patient taking differently: Take 1 tablet by mouth Every Evening.) 30 tablet 3 10/16/2022 at 2000   • aspirin (ASPIR) 81 MG EC tablet Take 1 tablet by mouth 2 (Two) Times a Day. 60 tablet 0 10/16/2022 at 2100   • Chlorhexidine Gluconate 4 % solution Shower daily with solution as directed for 5 days prior to surgery. 237 mL 0 10/16/2022 at 2000   • Magnesium Oxide 500 MG tablet Take 1 tablet by mouth Daily.   10/16/2022 at 0800   • Multiple Vitamins-Minerals (MULTIVITAL PO) Take 1 dose by mouth Every Morning.   10/16/2022 at 0800   • mupirocin (BACTROBAN) 2 % ointment Apply a pea-sized amount into each nostril as directed by provider twice daily for 5 days prior to surgery 22 g 0 10/16/2022 at 2000   • celecoxib (CeleBREX) 200 MG capsule Take 200 mg by mouth 2 (Two) Times a Day.   10/3/2022   • Dextromethorphan-guaiFENesin (MUCUS RELIEF DM PO) Take 1,200 mg by mouth As Needed.   More than a month   • oxyCODONE (Roxicodone) 5 MG immediate release tablet Take 1  "tablet by mouth Every 4 (Four) Hours As Needed for Moderate Pain. 40 tablet 0          PMH:   Past Medical History:   Diagnosis Date   • Arthritis    • Hip arthrosis 01/2021   • History of esophageal stricture 2015   • History of gastric ulcer 2017   • History of transfusion 2017    4 units due to GI bleed, no reactions   • Hypertension    • Inguinal hernia    • Knee swelling 01/2022   • Post-traumatic brain syndrome 2018    r/t falling outside in winter. Lost sense of smell, concussion and brain bleed     PSH:    Past Surgical History:   Procedure Laterality Date   • APPENDECTOMY  1999   • COLONOSCOPY  2015   • ESOPHAGEAL DILATATION  2015   • HIP SURGERY  9/12/2022   • INGUINAL HERNIA REPAIR Bilateral 06/18/2020    Procedure: INGUINAL HERNIA REPAIR BILATERAL WITH MESH;  Surgeon: Johnathan Durant MD;  [facility];  Service: General;  Laterality: Bilateral;   • TONSILLECTOMY AND ADENOIDECTOMY  1959   • TOTAL HIP ARTHROPLASTY Right 09/12/2022    Procedure: TOTAL HIP ARTHROPLASTY RIGHT;  Surgeon: Davin Swenson MD;  Location: [facility] OR;  Service: Orthopedics;  Laterality: Right;   • UPPER GASTROINTESTINAL ENDOSCOPY  2015       Immunization History:  Influenza: 2022  Pneumococcal: UTD  Tetanus: UTD  Covid x4: 2022    Social History:   Tobacco:   Social History     Tobacco Use   Smoking Status Never   Smokeless Tobacco Never      Alcohol:     Social History     Substance and Sexual Activity   Alcohol Use No         Physical Exam:/90 (BP Location: Left arm, Patient Position: Lying)   Pulse 99   Temp 97.4 °F (36.3 °C) (Tympanic)   Resp 16   Ht 175.3 cm (69\")   Wt 88.9 kg (196 lb)   SpO2 98%   BMI 28.94 kg/m²       General Appearance:    Alert, cooperative, no distress, appears stated age   Head:    Normocephalic, without obvious abnormality, atraumatic   Lungs:     Clear to auscultation bilaterally, respirations unlabored    Heart:   Regular rate and rhythm, S1 and S2 normal    Abdomen:    " Soft without tenderness   Extremities:   Extremities normal, atraumatic, no cyanosis or edema   Skin:   Skin color, texture, turgor normal, no rashes or lesions   Neurologic:   Grossly intact     Results Review:     LABS:  Lab Results   Component Value Date    WBC 7.54 10/10/2022    HGB 14.2 10/10/2022    HCT 41.2 10/10/2022    MCV 96.0 10/10/2022     10/10/2022    NEUTROABS 4.30 10/10/2022    GLUCOSE 89 10/10/2022    BUN 18 10/10/2022    CREATININE 1.06 10/10/2022    EGFRIFNONA 75 11/09/2021     10/10/2022    K 4.4 10/10/2022     10/10/2022    CO2 27.0 10/10/2022    MG 2.1 07/27/2022    PHOS 1.6 (L) 01/02/2018    CALCIUM 9.9 10/10/2022    ALBUMIN 4.50 10/10/2022    AST 17 10/10/2022    ALT 18 10/10/2022    BILITOT 0.4 10/10/2022       RADIOLOGY:  Imaging Results (Last 72 Hours)     ** No results found for the last 72 hours. **          I reviewed the patient's new clinical results.    Cancer Staging (if applicable)  Cancer Patient: __ yes __no __unknown; If yes, clinical stage T:__ N:__M:__, stage group or __N/A      Impression: Primary osteoarthritis of left hip      Plan: TOTAL HIP ARTHROPLASTY LEFT      Magaly Serrano, APRN   10/17/2022   08:45 EDT   Present, unchanged

## 2022-11-01 NOTE — OB PROVIDER TRIAGE NOTE - INTERNATIONAL TRAVEL
No Klisyri Pregnancy And Lactation Text: It is unknown if this medication can harm a developing fetus or if it is excreted in breast milk.

## 2022-12-12 ENCOUNTER — NON-APPOINTMENT (OUTPATIENT)
Age: 32
End: 2022-12-12

## 2023-01-10 NOTE — OB PROVIDER DELIVERY SUMMARY - NSANTENATALSTERA_OBGYN_ALL_OB
Patient, Zaira Dowell (MRN #[id number]), presented with a recorded BMI of 41.68 kg/m^2 consistent with the definition of morbid obesity (ICD-10 E66.01). The patient's morbid obesity was monitored, evaluated, addressed and/or treated. This addendum to the medical record is made on 01/10/2023.   Not applicable as gestational age is greater than or equal to 34 weeks.

## 2023-07-05 NOTE — OB PROVIDER TRIAGE NOTE - NSFIRSTDATEVISIT_OBGYN_ALL_OB
-- DO NOT REPLY / DO NOT REPLY ALL --  -- Message is from Engagement Center Operations (ECO) --    General Patient Message: BARRY with [facility] would like status from clinical team of Dr. Lakesha Dominguez in regards to Aid Benefit Care Form that was faxed on 6/2/2023.  Please contact the caller.    Caller Information       Type Contact Phone/Fax    06/29/2023 01:17 PM CDT Phone (Incoming) barry [phone]     [facility]    07/03/2023 03:42 PM CDT Phone (Incoming) [facility] [phone]    07/05/2023 02:21 PM CDT Phone (Incoming)  with  Care Services (Other) [phone]        Alternative phone number: None    Can a detailed message be left? No    Message Turnaround:     Is it Working Hours? Yes - Working Hours     [place]:    Please give this turnaround time to the caller:   \"This message will be sent to [state Provider's name]. The clinical team will fulfill your request as soon as they review your message.\"                 Known

## 2024-07-15 NOTE — DISCHARGE NOTE OB - DRINK 8 TO 10 GLASSES OF FLUID EACH DAY
Female dropped off paperwork for the Admitting process for [facility]. Disregard previous forms for other care facilities. If questions, please call Yury Richmond @ [phone]   Statement Selected

## 2025-03-13 ENCOUNTER — APPOINTMENT (OUTPATIENT)
Dept: ANTEPARTUM | Facility: CLINIC | Age: 35
End: 2025-03-13

## 2025-03-14 ENCOUNTER — ASOB RESULT (OUTPATIENT)
Age: 35
End: 2025-03-14

## 2025-03-14 ENCOUNTER — APPOINTMENT (OUTPATIENT)
Dept: ANTEPARTUM | Facility: CLINIC | Age: 35
End: 2025-03-14
Payer: COMMERCIAL

## 2025-03-14 PROCEDURE — 76811 OB US DETAILED SNGL FETUS: CPT

## 2025-03-18 ENCOUNTER — EMERGENCY (EMERGENCY)
Facility: HOSPITAL | Age: 35
LOS: 1 days | Discharge: HOME | End: 2025-03-18
Admitting: EMERGENCY MEDICINE
Payer: COMMERCIAL

## 2025-03-18 VITALS
WEIGHT: 154.98 LBS | HEART RATE: 107 BPM | RESPIRATION RATE: 16 BRPM | DIASTOLIC BLOOD PRESSURE: 78 MMHG | TEMPERATURE: 100 F | OXYGEN SATURATION: 100 % | SYSTOLIC BLOOD PRESSURE: 115 MMHG | HEIGHT: 68 IN

## 2025-03-18 DIAGNOSIS — Z98.891 HISTORY OF UTERINE SCAR FROM PREVIOUS SURGERY: Chronic | ICD-10-CM

## 2025-03-18 LAB
ALBUMIN SERPL ELPH-MCNC: 3.2 G/DL — LOW (ref 3.3–5)
ALP SERPL-CCNC: 88 U/L — SIGNIFICANT CHANGE UP (ref 40–120)
ALT FLD-CCNC: 9 U/L — SIGNIFICANT CHANGE UP (ref 4–33)
ANION GAP SERPL CALC-SCNC: 13 MMOL/L — SIGNIFICANT CHANGE UP (ref 7–14)
APPEARANCE UR: ABNORMAL
AST SERPL-CCNC: 12 U/L — SIGNIFICANT CHANGE UP (ref 4–32)
BACTERIA # UR AUTO: NEGATIVE /HPF — SIGNIFICANT CHANGE UP
BASOPHILS # BLD AUTO: 0.01 K/UL — SIGNIFICANT CHANGE UP (ref 0–0.2)
BASOPHILS NFR BLD AUTO: 0.2 % — SIGNIFICANT CHANGE UP (ref 0–2)
BILIRUB SERPL-MCNC: 0.3 MG/DL — SIGNIFICANT CHANGE UP (ref 0.2–1.2)
BILIRUB UR-MCNC: NEGATIVE — SIGNIFICANT CHANGE UP
BLOOD GAS VENOUS COMPREHENSIVE RESULT: SIGNIFICANT CHANGE UP
BUN SERPL-MCNC: 4 MG/DL — LOW (ref 7–23)
CALCIUM SERPL-MCNC: 8.7 MG/DL — SIGNIFICANT CHANGE UP (ref 8.4–10.5)
CAST: 0 /LPF — SIGNIFICANT CHANGE UP (ref 0–4)
CHLORIDE SERPL-SCNC: 100 MMOL/L — SIGNIFICANT CHANGE UP (ref 98–107)
CO2 SERPL-SCNC: 23 MMOL/L — SIGNIFICANT CHANGE UP (ref 22–31)
COLOR SPEC: YELLOW — SIGNIFICANT CHANGE UP
CREAT SERPL-MCNC: 0.43 MG/DL — LOW (ref 0.5–1.3)
DIFF PNL FLD: NEGATIVE — SIGNIFICANT CHANGE UP
EGFR: 132 ML/MIN/1.73M2 — SIGNIFICANT CHANGE UP
EGFR: 132 ML/MIN/1.73M2 — SIGNIFICANT CHANGE UP
EOSINOPHIL # BLD AUTO: 0.01 K/UL — SIGNIFICANT CHANGE UP (ref 0–0.5)
EOSINOPHIL NFR BLD AUTO: 0.2 % — SIGNIFICANT CHANGE UP (ref 0–6)
GLUCOSE SERPL-MCNC: 134 MG/DL — HIGH (ref 70–99)
GLUCOSE UR QL: NEGATIVE MG/DL — SIGNIFICANT CHANGE UP
HCT VFR BLD CALC: 33.4 % — LOW (ref 34.5–45)
HGB BLD-MCNC: 11.2 G/DL — LOW (ref 11.5–15.5)
IANC: 4.94 K/UL — SIGNIFICANT CHANGE UP (ref 1.8–7.4)
IMM GRANULOCYTES NFR BLD AUTO: 0.3 % — SIGNIFICANT CHANGE UP (ref 0–0.9)
KETONES UR-MCNC: NEGATIVE MG/DL — SIGNIFICANT CHANGE UP
LEUKOCYTE ESTERASE UR-ACNC: NEGATIVE — SIGNIFICANT CHANGE UP
LYMPHOCYTES # BLD AUTO: 1.01 K/UL — SIGNIFICANT CHANGE UP (ref 1–3.3)
LYMPHOCYTES # BLD AUTO: 16 % — SIGNIFICANT CHANGE UP (ref 13–44)
MAGNESIUM SERPL-MCNC: 1.8 MG/DL — SIGNIFICANT CHANGE UP (ref 1.6–2.6)
MCHC RBC-ENTMCNC: 29.6 PG — SIGNIFICANT CHANGE UP (ref 27–34)
MCHC RBC-ENTMCNC: 33.5 G/DL — SIGNIFICANT CHANGE UP (ref 32–36)
MCV RBC AUTO: 88.4 FL — SIGNIFICANT CHANGE UP (ref 80–100)
MONOCYTES # BLD AUTO: 0.32 K/UL — SIGNIFICANT CHANGE UP (ref 0–0.9)
MONOCYTES NFR BLD AUTO: 5.1 % — SIGNIFICANT CHANGE UP (ref 2–14)
NEUTROPHILS # BLD AUTO: 4.94 K/UL — SIGNIFICANT CHANGE UP (ref 1.8–7.4)
NEUTROPHILS NFR BLD AUTO: 78.2 % — HIGH (ref 43–77)
NITRITE UR-MCNC: NEGATIVE — SIGNIFICANT CHANGE UP
NRBC # BLD AUTO: 0 K/UL — SIGNIFICANT CHANGE UP (ref 0–0)
NRBC # FLD: 0 K/UL — SIGNIFICANT CHANGE UP (ref 0–0)
NRBC BLD AUTO-RTO: 0 /100 WBCS — SIGNIFICANT CHANGE UP (ref 0–0)
PH UR: 7.5 — SIGNIFICANT CHANGE UP (ref 5–8)
PLATELET # BLD AUTO: 171 K/UL — SIGNIFICANT CHANGE UP (ref 150–400)
POTASSIUM SERPL-MCNC: 3.4 MMOL/L — LOW (ref 3.5–5.3)
POTASSIUM SERPL-SCNC: 3.4 MMOL/L — LOW (ref 3.5–5.3)
PROT SERPL-MCNC: 6.2 G/DL — SIGNIFICANT CHANGE UP (ref 6–8.3)
PROT UR-MCNC: NEGATIVE MG/DL — SIGNIFICANT CHANGE UP
RBC # BLD: 3.78 M/UL — LOW (ref 3.8–5.2)
RBC # FLD: 12.5 % — SIGNIFICANT CHANGE UP (ref 10.3–14.5)
RBC CASTS # UR COMP ASSIST: 0 /HPF — SIGNIFICANT CHANGE UP (ref 0–4)
SODIUM SERPL-SCNC: 136 MMOL/L — SIGNIFICANT CHANGE UP (ref 135–145)
SP GR SPEC: 1.01 — SIGNIFICANT CHANGE UP (ref 1–1.03)
SQUAMOUS # UR AUTO: 2 /HPF — SIGNIFICANT CHANGE UP (ref 0–5)
UROBILINOGEN FLD QL: 1 MG/DL — SIGNIFICANT CHANGE UP (ref 0.2–1)
WBC # BLD: 6.31 K/UL — SIGNIFICANT CHANGE UP (ref 3.8–10.5)
WBC # FLD AUTO: 6.31 K/UL — SIGNIFICANT CHANGE UP (ref 3.8–10.5)
WBC UR QL: 0 /HPF — SIGNIFICANT CHANGE UP (ref 0–5)

## 2025-03-18 PROCEDURE — 93010 ELECTROCARDIOGRAM REPORT: CPT

## 2025-03-18 PROCEDURE — 99284 EMERGENCY DEPT VISIT MOD MDM: CPT

## 2025-03-18 RX ORDER — METOCLOPRAMIDE HCL 10 MG
10 TABLET ORAL ONCE
Refills: 0 | Status: COMPLETED | OUTPATIENT
Start: 2025-03-18 | End: 2025-03-18

## 2025-03-18 RX ORDER — ACETAMINOPHEN 500 MG/5ML
1000 LIQUID (ML) ORAL ONCE
Refills: 0 | Status: COMPLETED | OUTPATIENT
Start: 2025-03-18 | End: 2025-03-18

## 2025-03-18 RX ADMIN — Medication 10 MILLIGRAM(S): at 22:58

## 2025-03-18 RX ADMIN — Medication 400 MILLIGRAM(S): at 22:59

## 2025-03-18 RX ADMIN — Medication 1000 MILLILITER(S): at 22:59

## 2025-03-18 NOTE — ED PROVIDER NOTE - NSICDXPASTSURGICALHX_GEN_ALL_CORE_FT
DISPLAY PLAN FREE TEXT
PAST SURGICAL HISTORY:  H/O:  section (2018, 36 weeks twins)    
DISPLAY PLAN FREE TEXT

## 2025-03-18 NOTE — ED ADULT TRIAGE NOTE - CHIEF COMPLAINT QUOTE
, 22 weeks pregnant, FALGUNI Jamil, GONZÁLEZ  c/o body aches, headaches, dizziness, chills. SOB x1 week, tested flu+ last week. hx depression. As per L&D Provider Henna franklin to be seen in ED.

## 2025-03-18 NOTE — ED PROVIDER NOTE - NSFOLLOWUPINSTRUCTIONS_ED_ALL_ED_FT
you were seen in the emergency room  please drink plenty of fluids  return to ER if any new, worsening or persistent symptoms  follow up with obgyn

## 2025-03-18 NOTE — ED ADULT NURSE NOTE - NSFALLUNIVINTERV_ED_ALL_ED
Bed/Stretcher in lowest position, wheels locked, appropriate side rails in place/Call bell, personal items and telephone in reach/Instruct patient to call for assistance before getting out of bed/chair/stretcher/Non-slip footwear applied when patient is off stretcher/[place] to call system/Physically safe environment - no spills, clutter or unnecessary equipment/Purposeful proactive rounding/Room/bathroom lighting operational, light cord in reach

## 2025-03-18 NOTE — ED ADULT NURSE NOTE - OBJECTIVE STATEMENT
34 year old AO4 ambulatory female c/o headache, dizziness, nausea, vomiting and flu like symptoms for 2 weeks. Pt is 22 weeks pregnant. . States her son at home had the flu. RR even and unlabored, Denies chest pain. Able to speak in full sentences, no facial droop or slurred speech noted. Right 20g IV placed, labs drawn, medicated as per eMAR. Orthostatic unable to at this time as pt requesting medications take effect first. Pt safety maintained.

## 2025-03-18 NOTE — ED PROVIDER NOTE - PROGRESS NOTE DETAILS
PETER Escobedo:discussed with L&D as pt is less than 23 weeks only requires fetal HR does not require NST  no OBGYN complaints   pt reassessed, reports feeling much better after IV fluids, offered pt CDU stay for supportive care however feeling better and tolerating PO, would prefer to go home at this time, reports she has zofran, tylenol, robitussin to take at home as needed  strict return precautions discussed

## 2025-03-18 NOTE — ED PROVIDER NOTE - NSICDXPASTMEDICALHX_GEN_ALL_CORE_FT
PAST MEDICAL HISTORY:  Anxiety     Depression started post partum in 2018    GERD (gastroesophageal reflux disease)

## 2025-03-18 NOTE — ED PROVIDER NOTE - PHYSICAL EXAMINATION
no meingsmus no meingsmus   Xolair Pregnancy And Lactation Text: This medication is Pregnancy Category B and is considered safe during pregnancy. This medication is excreted in breast milk.

## 2025-03-18 NOTE — ED PROVIDER NOTE - PATIENT PORTAL LINK FT
You can access the FollowMyHealth Patient Portal offered by [facility] by registering at the following website: [email or web address]. By joining [facility]’s FollowMyHealth portal, you will also be able to view your health information using other applications (apps) compatible with our system.

## 2025-03-18 NOTE — ED PROVIDER NOTE - CLINICAL SUMMARY MEDICAL DECISION MAKING FREE TEXT BOX
Pt is a 33 YO F with PMH  ~ 22 weeks pregnant, GONZÁLEZ  who presented to ED with fatigue, nausea. vomiting, poor oral intake x 1 week in setting of testing + for influenza. Pt reports poor PO intake, fatigue, nausea. Pt lungs CTA bilaterally on exam. Non meningitic. Plan for labs, fetal HR, IV fluid hydration, PO challenge and will reassess.

## 2025-03-18 NOTE — ED PROVIDER NOTE - OBJECTIVE STATEMENT
Pt is a 35 YO F with PMH  ~ 22 weeks pregnant, GONZÁLEZ  who presented to ED with fatigue, nausea. vomiting, poor oral intake x 1 week. Pt reports she tested positive for influenza last week on Wednesday. Pt reports unable to tolerate Tamiflu at home tried for 2 days however would throw up shortly after. Pt reports today still unable to eat or drink much, having headache and feeling fatigued. Pt reports taking Tylenol this AM and Cepacol lozenge without relief. Reports still feeling normal movements from baby.   Pt report FALGUNI Jamil

## 2025-03-19 VITALS
TEMPERATURE: 98 F | HEART RATE: 92 BPM | OXYGEN SATURATION: 100 % | SYSTOLIC BLOOD PRESSURE: 95 MMHG | DIASTOLIC BLOOD PRESSURE: 63 MMHG | RESPIRATION RATE: 20 BRPM

## 2025-03-19 RX ORDER — METOCLOPRAMIDE HCL 10 MG
1 TABLET ORAL
Qty: 4 | Refills: 0
Start: 2025-03-19 | End: 2025-03-20

## 2025-03-19 RX ADMIN — Medication 1000 MILLILITER(S): at 00:31

## 2025-03-20 LAB
CULTURE RESULTS: SIGNIFICANT CHANGE UP
SPECIMEN SOURCE: SIGNIFICANT CHANGE UP

## 2025-03-24 LAB
CULTURE RESULTS: SIGNIFICANT CHANGE UP
CULTURE RESULTS: SIGNIFICANT CHANGE UP
SPECIMEN SOURCE: SIGNIFICANT CHANGE UP
SPECIMEN SOURCE: SIGNIFICANT CHANGE UP

## 2025-06-11 NOTE — OB PROVIDER TRIAGE NOTE - NS_FETALANOMAL_OBGYN_ALL_OB
Spoke with Dr. Tova MD accidentally discontinued colchicine.     colchicine (COLCRYS) 0.6 MG tablet 180 tablet 1 6/11/2025 --    Sig: Take 2 tablets (1.2 mg total) by mouth as directed. Start with 1 tablet daily for 2 weeks, and if needed can go up to 1 tablet twice daily if tolerated.      Script resent     No

## 2025-07-09 ENCOUNTER — ASOB RESULT (OUTPATIENT)
Age: 35
End: 2025-07-09

## 2025-07-09 ENCOUNTER — OUTPATIENT (OUTPATIENT)
Dept: INPATIENT UNIT | Facility: HOSPITAL | Age: 35
LOS: 1 days | End: 2025-07-09
Payer: COMMERCIAL

## 2025-07-09 ENCOUNTER — APPOINTMENT (OUTPATIENT)
Dept: ANTEPARTUM | Facility: CLINIC | Age: 35
End: 2025-07-09
Payer: COMMERCIAL

## 2025-07-09 VITALS
DIASTOLIC BLOOD PRESSURE: 62 MMHG | RESPIRATION RATE: 15 BRPM | OXYGEN SATURATION: 100 % | TEMPERATURE: 98 F | SYSTOLIC BLOOD PRESSURE: 106 MMHG | HEART RATE: 100 BPM

## 2025-07-09 VITALS — DIASTOLIC BLOOD PRESSURE: 58 MMHG | HEART RATE: 93 BPM | SYSTOLIC BLOOD PRESSURE: 109 MMHG

## 2025-07-09 DIAGNOSIS — O26.899 OTHER SPECIFIED PREGNANCY RELATED CONDITIONS, UNSPECIFIED TRIMESTER: ICD-10-CM

## 2025-07-09 PROCEDURE — 76819 FETAL BIOPHYS PROFIL W/O NST: CPT | Mod: 26

## 2025-07-09 PROCEDURE — 76815 OB US LIMITED FETUS(S): CPT | Mod: 26

## 2025-07-09 PROCEDURE — 99213 OFFICE O/P EST LOW 20 MIN: CPT

## 2025-07-09 NOTE — OB PROVIDER TRIAGE NOTE - YOU WERE IN THE HOSPITAL FOR:
NST reactive  BPP 8/8  patient feels well, denies pain  d/w Dr. Castellanos  pt to be discharged home with general OB labor instructions  daily kick counts  pt instructed to follow up with OB as scheduled

## 2025-07-09 NOTE — OB PROVIDER TRIAGE NOTE - NSHPPHYSICALEXAM_GEN_ALL_CORE
on unit A&O x3  NAD  FHT: NST reactive  TOCO: mild irreg ctx  abdomen: gravid, soft, nontender  TAUS: All images saved in ASOB       cephalic presentation    Vital Signs Last 24 Hrs  T(C): 36.8 (09 Jul 2025 16:06), Max: 36.8 (09 Jul 2025 16:06)  T(F): 98.2 (09 Jul 2025 16:06), Max: 98.2 (09 Jul 2025 16:06)  HR: 100 (09 Jul 2025 16:10) (100 - 100)  BP: 106/62 (09 Jul 2025 16:10) (106/62 - 106/62)  BP(mean): --  RR: 15 (09 Jul 2025 16:06) (15 - 15)  SpO2: 100% (09 Jul 2025 16:06) (100% - 100%) A&O x3  NAD  FHT: NST reactive  TOCO: mild irreg ctx  abdomen: gravid, soft, nontender  TAUS: All images saved in ASOB       cephalic presentation       posterior placenta       WENDY 8.93       FHR 125bpm        BPP 8/8    Vital Signs Last 24 Hrs  T(C): 36.8 (09 Jul 2025 16:06), Max: 36.8 (09 Jul 2025 16:06)  T(F): 98.2 (09 Jul 2025 16:06), Max: 98.2 (09 Jul 2025 16:06)  HR: 100 (09 Jul 2025 16:10) (100 - 100)  BP: 106/62 (09 Jul 2025 16:10) (106/62 - 106/62)  BP(mean): --  RR: 15 (09 Jul 2025 16:06) (15 - 15)  SpO2: 100% (09 Jul 2025 16:06) (100% - 100%)

## 2025-07-09 NOTE — OB PROVIDER TRIAGE NOTE - HISTORY OF PRESENT ILLNESS
33 y/o  at 37.2 weeks gestation sent in from OB office NRNST with decels visualized on FHR monitor ("decels seen, no contractions, lasting approx 30 seconds each). Denies contractions, abdominal pain, back pain, cramping, leaking of fluid, vaginal bleeding. Endorses good fetal movement    NKDA  PNC with Dr. Castellanos  AP course c/b prior c/s desires TOLAC  Last PO intake 3p

## 2025-07-09 NOTE — OB PROVIDER TRIAGE NOTE - NSOBPROVIDERNOTE_OBGYN_ALL_OB_FT
NST reactive  BPP  patient feels well, denies pain  d/w NST reactive  BPP 8/8  patient feels well, denies pain  d/w Dr. Castellnaos  pt to be discharged home with general OB labor instructions  daily kick counts  pt instructed to follow up with OB as scheduled

## 2025-07-11 DIAGNOSIS — O36.8330 MATERNAL CARE FOR ABNORMALITIES OF THE FETAL HEART RATE OR RHYTHM, THIRD TRIMESTER, NOT APPLICABLE OR UNSPECIFIED: ICD-10-CM

## 2025-07-11 DIAGNOSIS — O99.343 OTHER MENTAL DISORDERS COMPLICATING PREGNANCY, THIRD TRIMESTER: ICD-10-CM

## 2025-07-11 DIAGNOSIS — Z3A.37 37 WEEKS GESTATION OF PREGNANCY: ICD-10-CM

## 2025-07-11 DIAGNOSIS — O34.219 MATERNAL CARE FOR UNSPECIFIED TYPE SCAR FROM PREVIOUS CESAREAN DELIVERY: ICD-10-CM

## 2025-07-11 DIAGNOSIS — F41.9 ANXIETY DISORDER, UNSPECIFIED: ICD-10-CM

## 2025-07-11 DIAGNOSIS — F32.A DEPRESSION, UNSPECIFIED: ICD-10-CM

## 2025-07-29 ENCOUNTER — APPOINTMENT (OUTPATIENT)
Dept: ANTEPARTUM | Facility: CLINIC | Age: 35
End: 2025-07-29
Payer: COMMERCIAL

## 2025-07-29 ENCOUNTER — INPATIENT (INPATIENT)
Facility: HOSPITAL | Age: 35
LOS: 1 days | Discharge: HOME | End: 2025-07-31
Attending: OBSTETRICS & GYNECOLOGY | Admitting: OBSTETRICS & GYNECOLOGY

## 2025-07-29 ENCOUNTER — ASOB RESULT (OUTPATIENT)
Age: 35
End: 2025-07-29

## 2025-07-29 VITALS
RESPIRATION RATE: 15 BRPM | SYSTOLIC BLOOD PRESSURE: 114 MMHG | OXYGEN SATURATION: 99 % | DIASTOLIC BLOOD PRESSURE: 64 MMHG | HEART RATE: 99 BPM | TEMPERATURE: 98 F

## 2025-07-29 DIAGNOSIS — O36.8190 DECREASED FETAL MOVEMENTS, UNSPECIFIED TRIMESTER, NOT APPLICABLE OR UNSPECIFIED: ICD-10-CM

## 2025-07-29 DIAGNOSIS — Z98.891 HISTORY OF UTERINE SCAR FROM PREVIOUS SURGERY: Chronic | ICD-10-CM

## 2025-07-29 DIAGNOSIS — O26.899 OTHER SPECIFIED PREGNANCY RELATED CONDITIONS, UNSPECIFIED TRIMESTER: ICD-10-CM

## 2025-07-29 LAB
BASOPHILS # BLD AUTO: 0.02 K/UL — SIGNIFICANT CHANGE UP (ref 0–0.2)
BASOPHILS NFR BLD AUTO: 0.3 % — SIGNIFICANT CHANGE UP (ref 0–2)
EOSINOPHIL # BLD AUTO: 0.04 K/UL — SIGNIFICANT CHANGE UP (ref 0–0.5)
EOSINOPHIL NFR BLD AUTO: 0.6 % — SIGNIFICANT CHANGE UP (ref 0–6)
HCT VFR BLD CALC: 36.2 % — SIGNIFICANT CHANGE UP (ref 34.5–45)
HGB BLD-MCNC: 11.8 G/DL — SIGNIFICANT CHANGE UP (ref 11.5–15.5)
IMM GRANULOCYTES # BLD AUTO: 0.03 K/UL — SIGNIFICANT CHANGE UP (ref 0–0.07)
IMM GRANULOCYTES NFR BLD AUTO: 0.5 % — SIGNIFICANT CHANGE UP (ref 0–0.9)
LYMPHOCYTES # BLD AUTO: 1.29 K/UL — SIGNIFICANT CHANGE UP (ref 1–3.3)
LYMPHOCYTES NFR BLD AUTO: 20.3 % — SIGNIFICANT CHANGE UP (ref 13–44)
MCHC RBC-ENTMCNC: 29.8 PG — SIGNIFICANT CHANGE UP (ref 27–34)
MCHC RBC-ENTMCNC: 32.6 G/DL — SIGNIFICANT CHANGE UP (ref 32–36)
MCV RBC AUTO: 91.4 FL — SIGNIFICANT CHANGE UP (ref 80–100)
MONOCYTES # BLD AUTO: 0.34 K/UL — SIGNIFICANT CHANGE UP (ref 0–0.9)
MONOCYTES NFR BLD AUTO: 5.4 % — SIGNIFICANT CHANGE UP (ref 2–14)
NEUTROPHILS # BLD AUTO: 4.62 K/UL — SIGNIFICANT CHANGE UP (ref 1.8–7.4)
NEUTROPHILS NFR BLD AUTO: 72.9 % — SIGNIFICANT CHANGE UP (ref 43–77)
NRBC # BLD AUTO: 0 K/UL — SIGNIFICANT CHANGE UP (ref 0–0)
NRBC # FLD: 0 K/UL — SIGNIFICANT CHANGE UP (ref 0–0)
NRBC BLD AUTO-RTO: 0 /100 WBCS — SIGNIFICANT CHANGE UP (ref 0–0)
PLATELET # BLD AUTO: 185 K/UL — SIGNIFICANT CHANGE UP (ref 150–400)
PMV BLD: 9.7 FL — SIGNIFICANT CHANGE UP (ref 7–13)
RBC # BLD: 3.96 M/UL — SIGNIFICANT CHANGE UP (ref 3.8–5.2)
RBC # FLD: 13.3 % — SIGNIFICANT CHANGE UP (ref 10.3–14.5)
T PALLIDUM AB TITR SER: NEGATIVE — SIGNIFICANT CHANGE UP
WBC # BLD: 6.34 K/UL — SIGNIFICANT CHANGE UP (ref 3.8–10.5)
WBC # FLD AUTO: 6.34 K/UL — SIGNIFICANT CHANGE UP (ref 3.8–10.5)

## 2025-07-29 PROCEDURE — 76815 OB US LIMITED FETUS(S): CPT | Mod: 26

## 2025-07-29 RX ORDER — DIPHENHYDRAMINE HCL 12.5MG/5ML
25 ELIXIR ORAL EVERY 6 HOURS
Refills: 0 | Status: DISCONTINUED | OUTPATIENT
Start: 2025-07-29 | End: 2025-07-31

## 2025-07-29 RX ORDER — ACETAMINOPHEN 500 MG/5ML
975 LIQUID (ML) ORAL
Refills: 0 | Status: DISCONTINUED | OUTPATIENT
Start: 2025-07-29 | End: 2025-07-31

## 2025-07-29 RX ORDER — OXYCODONE HYDROCHLORIDE 30 MG/1
5 TABLET ORAL
Refills: 0 | Status: DISCONTINUED | OUTPATIENT
Start: 2025-07-29 | End: 2025-07-31

## 2025-07-29 RX ORDER — SIMETHICONE 80 MG
80 TABLET,CHEWABLE ORAL EVERY 4 HOURS
Refills: 0 | Status: DISCONTINUED | OUTPATIENT
Start: 2025-07-29 | End: 2025-07-31

## 2025-07-29 RX ORDER — SODIUM CHLORIDE 9 G/1000ML
1000 INJECTION, SOLUTION INTRAVENOUS
Refills: 0 | Status: DISCONTINUED | OUTPATIENT
Start: 2025-07-29 | End: 2025-07-29

## 2025-07-29 RX ORDER — OXYTOCIN-SODIUM CHLORIDE 0.9% IV SOLN 30 UNIT/500ML 30-0.9/5 UT/ML-%
167 SOLUTION INTRAVENOUS
Qty: 30 | Refills: 0 | Status: DISCONTINUED | OUTPATIENT
Start: 2025-07-29 | End: 2025-07-31

## 2025-07-29 RX ORDER — PRENATAL 136/IRON/FOLIC ACID 27 MG-1 MG
1 TABLET ORAL DAILY
Refills: 0 | Status: DISCONTINUED | OUTPATIENT
Start: 2025-07-29 | End: 2025-07-31

## 2025-07-29 RX ORDER — DIBUCAINE 10 MG/G
1 OINTMENT TOPICAL EVERY 6 HOURS
Refills: 0 | Status: DISCONTINUED | OUTPATIENT
Start: 2025-07-29 | End: 2025-07-31

## 2025-07-29 RX ORDER — ESCITALOPRAM OXALATE 20 MG/1
5 TABLET ORAL DAILY
Refills: 0 | Status: DISCONTINUED | OUTPATIENT
Start: 2025-07-30 | End: 2025-07-31

## 2025-07-29 RX ORDER — SODIUM CHLORIDE 9 G/1000ML
500 INJECTION, SOLUTION INTRAVENOUS ONCE
Refills: 0 | Status: DISCONTINUED | OUTPATIENT
Start: 2025-07-29 | End: 2025-07-31

## 2025-07-29 RX ORDER — KETOROLAC TROMETHAMINE 30 MG/ML
30 INJECTION, SOLUTION INTRAMUSCULAR; INTRAVENOUS ONCE
Refills: 0 | Status: DISCONTINUED | OUTPATIENT
Start: 2025-07-29 | End: 2025-07-29

## 2025-07-29 RX ORDER — MODIFIED LANOLIN 100 %
1 CREAM (GRAM) TOPICAL EVERY 6 HOURS
Refills: 0 | Status: DISCONTINUED | OUTPATIENT
Start: 2025-07-29 | End: 2025-07-31

## 2025-07-29 RX ORDER — OXYTOCIN-SODIUM CHLORIDE 0.9% IV SOLN 30 UNIT/500ML 30-0.9/5 UT/ML-%
167 SOLUTION INTRAVENOUS
Qty: 30 | Refills: 0 | Status: DISCONTINUED | OUTPATIENT
Start: 2025-07-29 | End: 2025-07-29

## 2025-07-29 RX ORDER — MAGNESIUM HYDROXIDE 400 MG/5ML
30 SUSPENSION ORAL
Refills: 0 | Status: DISCONTINUED | OUTPATIENT
Start: 2025-07-29 | End: 2025-07-31

## 2025-07-29 RX ORDER — OXYTOCIN-SODIUM CHLORIDE 0.9% IV SOLN 30 UNIT/500ML 30-0.9/5 UT/ML-%
1 SOLUTION INTRAVENOUS
Qty: 30 | Refills: 0 | Status: DISCONTINUED | OUTPATIENT
Start: 2025-07-29 | End: 2025-07-29

## 2025-07-29 RX ORDER — BENZOCAINE 220 MG/G
1 SPRAY, METERED PERIODONTAL EVERY 6 HOURS
Refills: 0 | Status: DISCONTINUED | OUTPATIENT
Start: 2025-07-29 | End: 2025-07-31

## 2025-07-29 RX ORDER — IBUPROFEN 200 MG
600 TABLET ORAL EVERY 6 HOURS
Refills: 0 | Status: COMPLETED | OUTPATIENT
Start: 2025-07-29 | End: 2026-06-27

## 2025-07-29 RX ORDER — HYDROCORTISONE 10 MG/G
1 CREAM TOPICAL EVERY 6 HOURS
Refills: 0 | Status: DISCONTINUED | OUTPATIENT
Start: 2025-07-29 | End: 2025-07-31

## 2025-07-29 RX ORDER — ONDANSETRON HCL/PF 4 MG/2 ML
4 VIAL (ML) INJECTION ONCE
Refills: 0 | Status: COMPLETED | OUTPATIENT
Start: 2025-07-29 | End: 2025-07-29

## 2025-07-29 RX ORDER — OXYCODONE HYDROCHLORIDE 30 MG/1
5 TABLET ORAL ONCE
Refills: 0 | Status: DISCONTINUED | OUTPATIENT
Start: 2025-07-29 | End: 2025-07-31

## 2025-07-29 RX ORDER — WITCH HAZEL LEAF
1 FLUID EXTRACT MISCELLANEOUS EVERY 4 HOURS
Refills: 0 | Status: DISCONTINUED | OUTPATIENT
Start: 2025-07-29 | End: 2025-07-31

## 2025-07-29 RX ORDER — PRAMOXINE HCL 1 %
1 GEL (GRAM) TOPICAL EVERY 4 HOURS
Refills: 0 | Status: DISCONTINUED | OUTPATIENT
Start: 2025-07-29 | End: 2025-07-31

## 2025-07-29 RX ADMIN — Medication 1 APPLICATION(S): at 23:31

## 2025-07-29 RX ADMIN — DIBUCAINE 1 APPLICATION(S): 10 OINTMENT TOPICAL at 23:30

## 2025-07-29 RX ADMIN — Medication 4 MILLIGRAM(S): at 09:01

## 2025-07-29 RX ADMIN — Medication 0.2 MILLIGRAM(S): at 18:22

## 2025-07-29 RX ADMIN — BENZOCAINE 1 SPRAY(S): 220 SPRAY, METERED PERIODONTAL at 23:30

## 2025-07-29 RX ADMIN — OXYTOCIN-SODIUM CHLORIDE 0.9% IV SOLN 30 UNIT/500ML 167 MILLIUNIT(S)/MIN: 30-0.9/5 SOLUTION at 18:40

## 2025-07-29 RX ADMIN — Medication 3 MILLILITER(S): at 22:30

## 2025-07-29 RX ADMIN — OXYTOCIN-SODIUM CHLORIDE 0.9% IV SOLN 30 UNIT/500ML 1 MILLIUNIT(S)/MIN: 30-0.9/5 SOLUTION at 12:01

## 2025-07-29 RX ADMIN — SODIUM CHLORIDE 125 MILLILITER(S): 9 INJECTION, SOLUTION INTRAVENOUS at 09:00

## 2025-07-29 RX ADMIN — Medication 4 MILLIGRAM(S): at 19:42

## 2025-07-29 RX ADMIN — Medication 975 MILLIGRAM(S): at 21:54

## 2025-07-29 RX ADMIN — Medication 975 MILLIGRAM(S): at 22:40

## 2025-07-29 RX ADMIN — HYDROCORTISONE 1 APPLICATION(S): 10 CREAM TOPICAL at 23:30

## 2025-07-29 RX ADMIN — OXYTOCIN-SODIUM CHLORIDE 0.9% IV SOLN 30 UNIT/500ML 167 MILLIUNIT(S)/MIN: 30-0.9/5 SOLUTION at 19:30

## 2025-07-30 RX ORDER — IBUPROFEN 200 MG
600 TABLET ORAL EVERY 6 HOURS
Refills: 0 | Status: DISCONTINUED | OUTPATIENT
Start: 2025-07-30 | End: 2025-07-31

## 2025-07-30 RX ADMIN — Medication 600 MILLIGRAM(S): at 11:53

## 2025-07-30 RX ADMIN — MAGNESIUM HYDROXIDE 30 MILLILITER(S): 400 SUSPENSION ORAL at 20:37

## 2025-07-30 RX ADMIN — Medication 600 MILLIGRAM(S): at 06:53

## 2025-07-30 RX ADMIN — OXYCODONE HYDROCHLORIDE 5 MILLIGRAM(S): 30 TABLET ORAL at 10:49

## 2025-07-30 RX ADMIN — ESCITALOPRAM OXALATE 5 MILLIGRAM(S): 20 TABLET ORAL at 06:01

## 2025-07-30 RX ADMIN — Medication 1 TABLET(S): at 11:53

## 2025-07-30 RX ADMIN — Medication 975 MILLIGRAM(S): at 15:36

## 2025-07-30 RX ADMIN — OXYCODONE HYDROCHLORIDE 5 MILLIGRAM(S): 30 TABLET ORAL at 21:07

## 2025-07-30 RX ADMIN — OXYCODONE HYDROCHLORIDE 5 MILLIGRAM(S): 30 TABLET ORAL at 11:45

## 2025-07-30 RX ADMIN — Medication 600 MILLIGRAM(S): at 12:45

## 2025-07-30 RX ADMIN — Medication 600 MILLIGRAM(S): at 05:59

## 2025-07-30 RX ADMIN — Medication 975 MILLIGRAM(S): at 10:15

## 2025-07-30 RX ADMIN — Medication 600 MILLIGRAM(S): at 01:41

## 2025-07-30 RX ADMIN — Medication 975 MILLIGRAM(S): at 22:01

## 2025-07-30 RX ADMIN — Medication 975 MILLIGRAM(S): at 09:15

## 2025-07-30 RX ADMIN — Medication 3 MILLILITER(S): at 22:49

## 2025-07-30 RX ADMIN — Medication 3 MILLILITER(S): at 06:12

## 2025-07-30 RX ADMIN — Medication 975 MILLIGRAM(S): at 16:30

## 2025-07-30 RX ADMIN — OXYCODONE HYDROCHLORIDE 5 MILLIGRAM(S): 30 TABLET ORAL at 20:37

## 2025-07-30 RX ADMIN — Medication 600 MILLIGRAM(S): at 00:57

## 2025-07-30 RX ADMIN — Medication 600 MILLIGRAM(S): at 18:30

## 2025-07-30 RX ADMIN — Medication 975 MILLIGRAM(S): at 21:31

## 2025-07-31 ENCOUNTER — TRANSCRIPTION ENCOUNTER (OUTPATIENT)
Age: 35
End: 2025-07-31

## 2025-07-31 VITALS
DIASTOLIC BLOOD PRESSURE: 79 MMHG | OXYGEN SATURATION: 100 % | SYSTOLIC BLOOD PRESSURE: 100 MMHG | HEART RATE: 67 BPM | RESPIRATION RATE: 16 BRPM | TEMPERATURE: 98 F

## 2025-07-31 RX ORDER — ESCITALOPRAM OXALATE 20 MG/1
1 TABLET ORAL
Qty: 30 | Refills: 0
Start: 2025-07-31 | End: 2025-08-29

## 2025-07-31 RX ORDER — ACETAMINOPHEN 500 MG/5ML
3 LIQUID (ML) ORAL
Qty: 0 | Refills: 0 | DISCHARGE
Start: 2025-07-31

## 2025-07-31 RX ORDER — PRENATAL 136/IRON/FOLIC ACID 27 MG-1 MG
0 TABLET ORAL
Refills: 0 | DISCHARGE

## 2025-07-31 RX ORDER — ESCITALOPRAM OXALATE 20 MG/1
2 TABLET ORAL
Refills: 0 | DISCHARGE

## 2025-07-31 RX ORDER — IBUPROFEN 200 MG
1 TABLET ORAL
Qty: 0 | Refills: 0 | DISCHARGE
Start: 2025-07-31

## 2025-07-31 RX ADMIN — Medication 3 MILLILITER(S): at 06:01

## 2025-07-31 RX ADMIN — ESCITALOPRAM OXALATE 5 MILLIGRAM(S): 20 TABLET ORAL at 11:33

## 2025-07-31 RX ADMIN — Medication 1 TABLET(S): at 11:34

## 2025-07-31 RX ADMIN — Medication 600 MILLIGRAM(S): at 11:33

## 2025-07-31 RX ADMIN — MAGNESIUM HYDROXIDE 30 MILLILITER(S): 400 SUSPENSION ORAL at 07:01
